# Patient Record
Sex: MALE | Race: WHITE | NOT HISPANIC OR LATINO | URBAN - METROPOLITAN AREA
[De-identification: names, ages, dates, MRNs, and addresses within clinical notes are randomized per-mention and may not be internally consistent; named-entity substitution may affect disease eponyms.]

---

## 2024-02-26 ENCOUNTER — INPATIENT (INPATIENT)
Facility: HOSPITAL | Age: 79
LOS: 6 days | Discharge: NURSING, REHAB OR LONG-TERM CARE | DRG: 322 | End: 2024-03-04
Attending: STUDENT IN AN ORGANIZED HEALTH CARE EDUCATION/TRAINING PROGRAM | Admitting: STUDENT IN AN ORGANIZED HEALTH CARE EDUCATION/TRAINING PROGRAM
Payer: COMMERCIAL

## 2024-02-26 VITALS
SYSTOLIC BLOOD PRESSURE: 167 MMHG | RESPIRATION RATE: 18 BRPM | HEART RATE: 67 BPM | HEIGHT: 64 IN | WEIGHT: 104.94 LBS | OXYGEN SATURATION: 99 % | TEMPERATURE: 98 F | DIASTOLIC BLOOD PRESSURE: 81 MMHG

## 2024-02-26 DIAGNOSIS — I25.10 ATHEROSCLEROTIC HEART DISEASE OF NATIVE CORONARY ARTERY WITHOUT ANGINA PECTORIS: ICD-10-CM

## 2024-02-26 DIAGNOSIS — R07.9 CHEST PAIN, UNSPECIFIED: ICD-10-CM

## 2024-02-26 DIAGNOSIS — R07.89 OTHER CHEST PAIN: ICD-10-CM

## 2024-02-26 DIAGNOSIS — Z29.9 ENCOUNTER FOR PROPHYLACTIC MEASURES, UNSPECIFIED: ICD-10-CM

## 2024-02-26 DIAGNOSIS — N40.0 BENIGN PROSTATIC HYPERPLASIA WITHOUT LOWER URINARY TRACT SYMPTOMS: ICD-10-CM

## 2024-02-26 DIAGNOSIS — E11.9 TYPE 2 DIABETES MELLITUS WITHOUT COMPLICATIONS: ICD-10-CM

## 2024-02-26 DIAGNOSIS — M25.561 PAIN IN RIGHT KNEE: ICD-10-CM

## 2024-02-26 LAB
ALBUMIN SERPL ELPH-MCNC: 3.9 G/DL — SIGNIFICANT CHANGE UP (ref 3.3–5)
ALP SERPL-CCNC: 89 U/L — SIGNIFICANT CHANGE UP (ref 40–120)
ALT FLD-CCNC: 15 U/L — SIGNIFICANT CHANGE UP (ref 10–45)
ANION GAP SERPL CALC-SCNC: 12 MMOL/L — SIGNIFICANT CHANGE UP (ref 5–17)
AST SERPL-CCNC: 12 U/L — SIGNIFICANT CHANGE UP (ref 10–40)
BASOPHILS # BLD AUTO: 0.04 K/UL — SIGNIFICANT CHANGE UP (ref 0–0.2)
BASOPHILS NFR BLD AUTO: 0.5 % — SIGNIFICANT CHANGE UP (ref 0–2)
BILIRUB SERPL-MCNC: 0.2 MG/DL — SIGNIFICANT CHANGE UP (ref 0.2–1.2)
BUN SERPL-MCNC: 48 MG/DL — HIGH (ref 7–23)
CALCIUM SERPL-MCNC: 9.9 MG/DL — SIGNIFICANT CHANGE UP (ref 8.4–10.5)
CHLORIDE SERPL-SCNC: 104 MMOL/L — SIGNIFICANT CHANGE UP (ref 96–108)
CHOLEST SERPL-MCNC: 147 MG/DL — SIGNIFICANT CHANGE UP
CK MB CFR SERPL CALC: 2.1 NG/ML — SIGNIFICANT CHANGE UP (ref 0–6.7)
CK SERPL-CCNC: 89 U/L — SIGNIFICANT CHANGE UP (ref 30–200)
CO2 SERPL-SCNC: 22 MMOL/L — SIGNIFICANT CHANGE UP (ref 22–31)
CREAT SERPL-MCNC: 1.2 MG/DL — SIGNIFICANT CHANGE UP (ref 0.5–1.3)
EGFR: 61 ML/MIN/1.73M2 — SIGNIFICANT CHANGE UP
EOSINOPHIL # BLD AUTO: 0.33 K/UL — SIGNIFICANT CHANGE UP (ref 0–0.5)
EOSINOPHIL NFR BLD AUTO: 4.3 % — SIGNIFICANT CHANGE UP (ref 0–6)
GLUCOSE BLDC GLUCOMTR-MCNC: 111 MG/DL — HIGH (ref 70–99)
GLUCOSE BLDC GLUCOMTR-MCNC: 133 MG/DL — HIGH (ref 70–99)
GLUCOSE BLDC GLUCOMTR-MCNC: 146 MG/DL — HIGH (ref 70–99)
GLUCOSE SERPL-MCNC: 87 MG/DL — SIGNIFICANT CHANGE UP (ref 70–99)
HCT VFR BLD CALC: 36 % — LOW (ref 39–50)
HDLC SERPL-MCNC: 33 MG/DL — LOW
HGB BLD-MCNC: 12.2 G/DL — LOW (ref 13–17)
IMM GRANULOCYTES NFR BLD AUTO: 0.3 % — SIGNIFICANT CHANGE UP (ref 0–0.9)
LIPID PNL WITH DIRECT LDL SERPL: 87 MG/DL — SIGNIFICANT CHANGE UP
LYMPHOCYTES # BLD AUTO: 2.61 K/UL — SIGNIFICANT CHANGE UP (ref 1–3.3)
LYMPHOCYTES # BLD AUTO: 34.3 % — SIGNIFICANT CHANGE UP (ref 13–44)
MCHC RBC-ENTMCNC: 31.2 PG — SIGNIFICANT CHANGE UP (ref 27–34)
MCHC RBC-ENTMCNC: 33.9 GM/DL — SIGNIFICANT CHANGE UP (ref 32–36)
MCV RBC AUTO: 92.1 FL — SIGNIFICANT CHANGE UP (ref 80–100)
MONOCYTES # BLD AUTO: 0.82 K/UL — SIGNIFICANT CHANGE UP (ref 0–0.9)
MONOCYTES NFR BLD AUTO: 10.8 % — SIGNIFICANT CHANGE UP (ref 2–14)
NEUTROPHILS # BLD AUTO: 3.78 K/UL — SIGNIFICANT CHANGE UP (ref 1.8–7.4)
NEUTROPHILS NFR BLD AUTO: 49.8 % — SIGNIFICANT CHANGE UP (ref 43–77)
NON HDL CHOLESTEROL: 114 MG/DL — SIGNIFICANT CHANGE UP
NRBC # BLD: 0 /100 WBCS — SIGNIFICANT CHANGE UP (ref 0–0)
NT-PROBNP SERPL-SCNC: 291 PG/ML — SIGNIFICANT CHANGE UP (ref 0–300)
PLATELET # BLD AUTO: 360 K/UL — SIGNIFICANT CHANGE UP (ref 150–400)
POTASSIUM SERPL-MCNC: 4.4 MMOL/L — SIGNIFICANT CHANGE UP (ref 3.5–5.3)
POTASSIUM SERPL-SCNC: 4.4 MMOL/L — SIGNIFICANT CHANGE UP (ref 3.5–5.3)
PROT SERPL-MCNC: 6.8 G/DL — SIGNIFICANT CHANGE UP (ref 6–8.3)
RBC # BLD: 3.91 M/UL — LOW (ref 4.2–5.8)
RBC # FLD: 11.7 % — SIGNIFICANT CHANGE UP (ref 10.3–14.5)
SODIUM SERPL-SCNC: 138 MMOL/L — SIGNIFICANT CHANGE UP (ref 135–145)
TRIGL SERPL-MCNC: 153 MG/DL — HIGH
TROPONIN T, HIGH SENSITIVITY RESULT: 26 NG/L — SIGNIFICANT CHANGE UP (ref 0–51)
TROPONIN T, HIGH SENSITIVITY RESULT: 28 NG/L — SIGNIFICANT CHANGE UP (ref 0–51)
TROPONIN T, HIGH SENSITIVITY RESULT: 31 NG/L — SIGNIFICANT CHANGE UP (ref 0–51)
TSH SERPL-MCNC: 1.21 UIU/ML — SIGNIFICANT CHANGE UP (ref 0.27–4.2)
WBC # BLD: 7.6 K/UL — SIGNIFICANT CHANGE UP (ref 3.8–10.5)
WBC # FLD AUTO: 7.6 K/UL — SIGNIFICANT CHANGE UP (ref 3.8–10.5)

## 2024-02-26 RX ORDER — DEXTROSE 50 % IN WATER 50 %
12.5 SYRINGE (ML) INTRAVENOUS ONCE
Refills: 0 | Status: DISCONTINUED | OUTPATIENT
Start: 2024-02-26 | End: 2024-03-04

## 2024-02-26 RX ORDER — FINASTERIDE 5 MG/1
1 TABLET, FILM COATED ORAL
Refills: 0 | DISCHARGE

## 2024-02-26 RX ORDER — CLOPIDOGREL BISULFATE 75 MG/1
1 TABLET, FILM COATED ORAL
Refills: 0 | DISCHARGE

## 2024-02-26 RX ORDER — DEXTROSE 50 % IN WATER 50 %
15 SYRINGE (ML) INTRAVENOUS ONCE
Refills: 0 | Status: DISCONTINUED | OUTPATIENT
Start: 2024-02-26 | End: 2024-03-04

## 2024-02-26 RX ORDER — ATORVASTATIN CALCIUM 80 MG/1
40 TABLET, FILM COATED ORAL AT BEDTIME
Refills: 0 | Status: DISCONTINUED | OUTPATIENT
Start: 2024-02-26 | End: 2024-03-04

## 2024-02-26 RX ORDER — SODIUM CHLORIDE 9 MG/ML
1000 INJECTION, SOLUTION INTRAVENOUS
Refills: 0 | Status: DISCONTINUED | OUTPATIENT
Start: 2024-02-26 | End: 2024-03-04

## 2024-02-26 RX ORDER — ACETAMINOPHEN 500 MG
725 TABLET ORAL ONCE
Refills: 0 | Status: COMPLETED | OUTPATIENT
Start: 2024-02-26 | End: 2024-02-26

## 2024-02-26 RX ORDER — LOSARTAN POTASSIUM 100 MG/1
1 TABLET, FILM COATED ORAL
Refills: 0 | DISCHARGE

## 2024-02-26 RX ORDER — ASPIRIN/CALCIUM CARB/MAGNESIUM 324 MG
81 TABLET ORAL DAILY
Refills: 0 | Status: DISCONTINUED | OUTPATIENT
Start: 2024-02-26 | End: 2024-03-04

## 2024-02-26 RX ORDER — METFORMIN HYDROCHLORIDE 850 MG/1
1 TABLET ORAL
Refills: 0 | DISCHARGE

## 2024-02-26 RX ORDER — LOSARTAN POTASSIUM 100 MG/1
100 TABLET, FILM COATED ORAL DAILY
Refills: 0 | Status: DISCONTINUED | OUTPATIENT
Start: 2024-02-26 | End: 2024-03-04

## 2024-02-26 RX ORDER — POLYETHYLENE GLYCOL 3350 17 G/17G
17 POWDER, FOR SOLUTION ORAL DAILY
Refills: 0 | Status: DISCONTINUED | OUTPATIENT
Start: 2024-02-26 | End: 2024-03-04

## 2024-02-26 RX ORDER — ATORVASTATIN CALCIUM 80 MG/1
1 TABLET, FILM COATED ORAL
Refills: 0 | DISCHARGE

## 2024-02-26 RX ORDER — LIDOCAINE 4 G/100G
1 CREAM TOPICAL
Refills: 0 | DISCHARGE

## 2024-02-26 RX ORDER — DEXTROSE 50 % IN WATER 50 %
25 SYRINGE (ML) INTRAVENOUS ONCE
Refills: 0 | Status: DISCONTINUED | OUTPATIENT
Start: 2024-02-26 | End: 2024-03-04

## 2024-02-26 RX ORDER — CARVEDILOL PHOSPHATE 80 MG/1
25 CAPSULE, EXTENDED RELEASE ORAL EVERY 12 HOURS
Refills: 0 | Status: DISCONTINUED | OUTPATIENT
Start: 2024-02-26 | End: 2024-03-04

## 2024-02-26 RX ORDER — ASPIRIN/CALCIUM CARB/MAGNESIUM 324 MG
1 TABLET ORAL
Refills: 0 | DISCHARGE

## 2024-02-26 RX ORDER — LIDOCAINE 4 G/100G
1 CREAM TOPICAL DAILY
Refills: 0 | Status: DISCONTINUED | OUTPATIENT
Start: 2024-02-26 | End: 2024-03-04

## 2024-02-26 RX ORDER — ISOSORBIDE MONONITRATE 60 MG/1
1 TABLET, EXTENDED RELEASE ORAL
Refills: 0 | DISCHARGE

## 2024-02-26 RX ORDER — POLYETHYLENE GLYCOL 3350 17 G/17G
17 POWDER, FOR SOLUTION ORAL
Refills: 0 | DISCHARGE

## 2024-02-26 RX ORDER — CLOPIDOGREL BISULFATE 75 MG/1
75 TABLET, FILM COATED ORAL DAILY
Refills: 0 | Status: DISCONTINUED | OUTPATIENT
Start: 2024-02-26 | End: 2024-03-04

## 2024-02-26 RX ORDER — ACETAMINOPHEN 500 MG
2 TABLET ORAL
Refills: 0 | DISCHARGE

## 2024-02-26 RX ORDER — CARVEDILOL PHOSPHATE 80 MG/1
1 CAPSULE, EXTENDED RELEASE ORAL
Refills: 0 | DISCHARGE

## 2024-02-26 RX ORDER — ISOSORBIDE MONONITRATE 60 MG/1
30 TABLET, EXTENDED RELEASE ORAL DAILY
Refills: 0 | Status: DISCONTINUED | OUTPATIENT
Start: 2024-02-26 | End: 2024-02-27

## 2024-02-26 RX ORDER — GLUCAGON INJECTION, SOLUTION 0.5 MG/.1ML
1 INJECTION, SOLUTION SUBCUTANEOUS ONCE
Refills: 0 | Status: DISCONTINUED | OUTPATIENT
Start: 2024-02-26 | End: 2024-03-04

## 2024-02-26 RX ORDER — FINASTERIDE 5 MG/1
5 TABLET, FILM COATED ORAL DAILY
Refills: 0 | Status: DISCONTINUED | OUTPATIENT
Start: 2024-02-26 | End: 2024-03-04

## 2024-02-26 RX ORDER — INSULIN LISPRO 100/ML
VIAL (ML) SUBCUTANEOUS
Refills: 0 | Status: DISCONTINUED | OUTPATIENT
Start: 2024-02-26 | End: 2024-03-04

## 2024-02-26 RX ORDER — INSULIN LISPRO 100/ML
VIAL (ML) SUBCUTANEOUS AT BEDTIME
Refills: 0 | Status: DISCONTINUED | OUTPATIENT
Start: 2024-02-26 | End: 2024-03-04

## 2024-02-26 RX ORDER — ACETAMINOPHEN 500 MG
650 TABLET ORAL EVERY 6 HOURS
Refills: 0 | Status: DISCONTINUED | OUTPATIENT
Start: 2024-02-26 | End: 2024-03-04

## 2024-02-26 RX ADMIN — LIDOCAINE 1 PATCH: 4 CREAM TOPICAL at 12:29

## 2024-02-26 RX ADMIN — Medication 81 MILLIGRAM(S): at 12:28

## 2024-02-26 RX ADMIN — LIDOCAINE 1 PATCH: 4 CREAM TOPICAL at 19:07

## 2024-02-26 RX ADMIN — Medication 650 MILLIGRAM(S): at 08:53

## 2024-02-26 RX ADMIN — CLOPIDOGREL BISULFATE 75 MILLIGRAM(S): 75 TABLET, FILM COATED ORAL at 12:29

## 2024-02-26 RX ADMIN — FINASTERIDE 5 MILLIGRAM(S): 5 TABLET, FILM COATED ORAL at 12:29

## 2024-02-26 RX ADMIN — ATORVASTATIN CALCIUM 40 MILLIGRAM(S): 80 TABLET, FILM COATED ORAL at 21:17

## 2024-02-26 RX ADMIN — Medication 650 MILLIGRAM(S): at 09:23

## 2024-02-26 RX ADMIN — CARVEDILOL PHOSPHATE 25 MILLIGRAM(S): 80 CAPSULE, EXTENDED RELEASE ORAL at 17:37

## 2024-02-26 RX ADMIN — Medication 725 MILLIGRAM(S): at 05:35

## 2024-02-26 RX ADMIN — ISOSORBIDE MONONITRATE 30 MILLIGRAM(S): 60 TABLET, EXTENDED RELEASE ORAL at 12:29

## 2024-02-26 RX ADMIN — Medication 290 MILLIGRAM(S): at 05:05

## 2024-02-26 NOTE — DISCHARGE NOTE PROVIDER - NSDCFUADDINST_GEN_ALL_CORE_FT
Wound Care:   the day AFTER your procedure remove bandage GENTLY, and clean using mild soap and gentle warm, water stream, pat dry. leave OPEN to air. YOU MAY SHOWER   DO NOT apply lotions, creams, ointments, powder, perfumes to your incision site  DO NOT SOAK your site for 1 week ( no baths, no pools, no tubs, etc)  Check  your groin and /or wirst daily.A small amount of bruising, and soarness are normal    ACTIVITY: for 24 hours   - DO NOT DRIVE  - DO NOT make any important decisions or sign legal documents   - DO NOT operate heavy machineries   - you may resume sexual activity in 48 hours, unless otherwise instructed by your cardiologist     If your procedure was done through the WRIST: for the NEXT 3DAYS:  - avoid pushing, pulling, with that affected wrist   - avoid repeated movement of that hand and wrist ( eg: typing, hammering)  - DO NOT LIFT anything more than 5 lbs     If your procedure was done through the GROIN: for the NEXT 5 DAYS  - Limit climbing stairs, DO NOT soak in bathtub or pool  - no strenuous activities, pushing, pulling, straining  - Do not lift anything 10lbs or heavier     MEDICATION:   take your medications as explained (see discharge paperwork)   If you received a STENT, you will be taking antiplatelet medications to KEEP YOUR STENT OPEN ( eg: Aspirin, Plavix, Brilinta, Effient, etc).  Take as prescribed DO NOT STOP taking them without consulting with your cardiologist first.     Follow heart healthy diet recommended by your doctor, if you smoke STOP SMOKING ( may call [phone] for center of tobacco control if you need assistance)     CALL your doctor to make appointment in 2 WEEKS     ***CALL YOUR DOCTOR***  if you experience: fever, chills, body aches, or severe pain, swelling, redness, heat or yellow discharge at incision site  If you experience Bleeding or excruciating pain at the procedural site, swelling ( golf ball size) at your procedural site  If you experience CHEST PAIN  If you experience extremity numbness, tingling, temperature change ( of your procedural site)   If you are unable to reach your doctor, you may contact:   -Cardiology Office at [facility] at [phone] or   - [facility] [phone]  - [facility] [phone]

## 2024-02-26 NOTE — ED PROVIDER NOTE - ATTENDING CONTRIBUTION TO CARE
MD Byrd:  patient seen and evaluated personally.   I agree with the History & Physical,  Impression & Plan other than what was detailed in my note.  MD Byrd  78-year-old male history of 7 stents on Xarelto chief complaint chest pain on left side, has not had pain like this before, started at rest, no associated sob, n/v, diaphoresis, no radiation, no leg swelling, non pleuritic, afebrile vitals stable  non toxic well appearing, NC/AT,  conjunctiva non conjected, sclera anicteric, moist mucous membranes, neck supple, heart sounds, normal, no mrg, lungs cta b/l no wrr, abd soft non distended w/ no tenderness, no visual deformities of extremities, axox3, , normal mood and affect, pt well appearing, feeling better, will need ekg, cbc, cmp, trop, pe considered, less likely given on xarelto, no rf for such, no sob/pleurisy,not hypoxic/tachcyardic, will get x ray chest.

## 2024-02-26 NOTE — ED PROVIDER NOTE - NS ED ATTENDING STATEMENT MOD
Caller: Benton Lizeth Moe    Relationship: Self    Best call back number: [phone]     Requested Prescriptions:   Requested Prescriptions     Pending Prescriptions Disp Refills    Insulin Pen Needle (Droplet Pen Needles) 31G X 5 MM misc 300 each 3     Sig: Use daily with insulin        Pharmacy where request should be sent: [facility] [address] [place] [address] [phone] [facility] [phone] FX     Last office visit with prescribing clinician: 12/4/2023   Last telemedicine visit with prescribing clinician: Visit date not found   Next office visit with prescribing clinician: Visit date not found     Additional details provided by patient: HAS A FEW PEN NEEDLES LEFT.  HAS 5 NEEDLES LEFT, USES 2 PER DAY    Does the patient have less than a 3 day supply:  [x] Yes  [] No    Would you like a call back once the refill request has been completed: [] Yes [x] No    If the office needs to give you a call back, can they leave a voicemail: [] Yes [x] No    Jena Yepez   12/18/23 11:56 EST     
Attending with

## 2024-02-26 NOTE — H&P ADULT - ASSESSMENT
78-year-old male history of  CAD s/p PCI (last 2021 to OM1), HTN, HLD, DM2, BPH presents with chest pain at rest from rehab admitted for ischemic evaluation

## 2024-02-26 NOTE — PHYSICAL THERAPY INITIAL EVALUATION ADULT - TRANSFER TRAINING, PT EVAL
GOAL: Pt will perform sit to/from stand transfers CGA with RW within 2-3 weeks. GOAL: Pt will perform sit to/from stand transfers independently with RW within 2-3 weeks.

## 2024-02-26 NOTE — ED ADULT NURSE REASSESSMENT NOTE - NS ED NURSE REASSESS COMMENT FT1
pt turned and repositioned; changed wet diaper; placed fresh absorbant pad; skin is intact. pt turned and repositioned; changed wet diaper; placed fresh absorbant pad; skin is intact, except for abrasions noted on bilateral knees.

## 2024-02-26 NOTE — ED PROVIDER NOTE - PHYSICAL EXAMINATION
GENERAL: Awake, alert, NAD  LUNGS: CTAB, no wheezes or crackles  Tenderness mild along the chest wall left side parasternal  CARDIAC: RRR, no m/r/g  ABDOMEN: Soft, non tender, non distended, no rebound, no guarding  EXT: No edema, no calf tenderness, 2+ DP pulses bilaterally, no deformities.  NEURO: Moving all extremities.  SKIN: Warm and dry. No rash.  PSYCH: Normal affect.

## 2024-02-26 NOTE — CONSULT NOTE ADULT - SUBJECTIVE AND OBJECTIVE BOX
DATE OF SERVICE: 02-26-24 @ 23:22    CHIEF COMPLAINT:Patient is a 78y old  Male who presents with a chief complaint of chest pain (26 Feb 2024 22:42)      HISTORY OF PRESENT ILLNESS:HPI:  Other MRN [id number]  78-year-old male history of  CAD s/p PCI (last 2021 to OM1), HTN, HLD, DM2, BPH presents from [facility] rehab with complaint chest pain about 11pm while lying in bed which awaken him.  Radhika is sharp, left chest, reproducible to touch, 7/10 in severity, lasted for 30min, intermittent, with no radiation or associated symptoms of SOB, diaphoresis, headache or LOC, similar to prior cardiac chest pain.  Currently, pain has improved    Last cardio visit (5/2023) atypical chest pain, recommended stress test and TTE but was not done. (26 Feb 2024 06:10)      PAST MEDICAL & SURGICAL HISTORY:  CAD S/P percutaneous coronary angioplasty      Diabetes mellitus      HTN (hypertension)      HLD (hyperlipidemia)      BPH (benign prostatic hyperplasia)      No significant past surgical history              MEDICATIONS:  aspirin  chewable 81 milliGRAM(s) Oral daily  carvedilol 25 milliGRAM(s) Oral every 12 hours  clopidogrel Tablet 75 milliGRAM(s) Oral daily  isosorbide   mononitrate ER Tablet (IMDUR) 30 milliGRAM(s) Oral daily  losartan 100 milliGRAM(s) Oral daily        acetaminophen     Tablet .. 650 milliGRAM(s) Oral every 6 hours PRN    polyethylene glycol 3350 17 Gram(s) Oral daily PRN    atorvastatin 40 milliGRAM(s) Oral at bedtime  dextrose 50% Injectable 25 Gram(s) IV Push once  dextrose 50% Injectable 25 Gram(s) IV Push once  dextrose 50% Injectable 12.5 Gram(s) IV Push once  dextrose Oral Gel 15 Gram(s) Oral once PRN  finasteride 5 milliGRAM(s) Oral daily  glucagon  Injectable 1 milliGRAM(s) IntraMuscular once  insulin lispro (ADMELOG) corrective regimen sliding scale   SubCutaneous at bedtime  insulin lispro (ADMELOG) corrective regimen sliding scale   SubCutaneous three times a day before meals    dextrose 5%. 1000 milliLiter(s) IV Continuous <Continuous>  dextrose 5%. 1000 milliLiter(s) IV Continuous <Continuous>  lidocaine   4% Patch 1 Patch Transdermal daily      FAMILY HISTORY:  FH: diabetes mellitus (Mother, Sibling)        Non-contributory    SOCIAL HISTORY:    [ ] not a current smoker    Allergies    No Known Drug Allergies  Fish Products (Hives)    Intolerances    	    REVIEW OF SYSTEMS:  CONSTITUTIONAL: No fever  EYES: No eye pain, visual disturbances, or discharge  ENMT:  No difficulty hearing, tinnitus  NECK: No pain or stiffness  RESPIRATORY: No cough, wheezing,  CARDIOVASCULAR: +chest pain, no palpitations, passing out, dizziness, or leg swelling  GASTROINTESTINAL:  No nausea, vomiting, diarrhea or constipation. No melena.  GENITOURINARY: No dysuria, hematuria  NEUROLOGICAL: No stroke like symptoms  SKIN: No burning or lesions   ENDOCRINE: No heat or cold intolerance  MUSCULOSKELETAL: No joint pain or swelling  PSYCHIATRIC: No  anxiety, mood swings  HEME/LYMPH: No bleeding gums  ALLERGY AND IMMUNOLOGIC: No hives or eczema	    All other ROS negative    PHYSICAL EXAM:  T(C): 36.7 (02-26-24 @ 19:39), Max: 37.1 (02-26-24 @ 16:00)  HR: 57 (02-26-24 @ 19:39) (57 - 69)  BP: 137/65 (02-26-24 @ 19:39) (131/73 - 167/81)  RR: 18 (02-26-24 @ 19:39) (15 - 18)  SpO2: 97% (02-26-24 @ 19:39) (97% - 100%)  Wt(kg): --  I&O's Summary    26 Feb 2024 07:01  -  26 Feb 2024 23:22  --------------------------------------------------------  IN: 0 mL / OUT: 400 mL / NET: -400 mL        Appearance: Normal	  HEENT:   Normal oral mucosa, EOMI	  Cardiovascular:  S1 S2, No JVD,    Respiratory: Lungs clear to auscultation	  Psychiatry: Alert  Gastrointestinal:  Soft, Non-tender, + BS	  Skin: No rashes   Neurologic: Non-focal  Extremities:  No edema  Vascular: Peripheral pulses palpable    	    	  	  CARDIAC MARKERS:  Labs personally reviewed by me                                  12.2   7.60  )-----------( 360      ( 26 Feb 2024 02:34 )             36.0     02-26    138  |  104  |  48<H>  ----------------------------<  87  4.4   |  22  |  1.20    Ca    9.9      26 Feb 2024 02:34    TPro  6.8  /  Alb  3.9  /  TBili  0.2  /  DBili  x   /  AST  12  /  ALT  15  /  AlkPhos  89  02-26          EKG: Personally reviewed by me - NSR ROLANDO LAFB  Radiology: Personally reviewed by me - CXR clear lungs    TTE CONCLUSIONS:   1. Images are suboptimal despite the use of Definity.   2. Left ventricular cavity is normal in size. Left ventricular wall thickness is normal.   3. Left ventricular endocardium is not well visualized; however, the left ventricular systolic function appears grossly normal.   4. Study quality precludes ability to rule-out regional wall motion abnormalities.        Assessment/Plan:  78-year-old male history of  CAD s/p PCI (last 2021 to OM1), HTN, HLD, DM2, BPH presents with chest pain at rest from rehab admitted for ischemic evaluation        Problem/Plan - 1:  ·  Problem: Atypical chest pain.   ·  Plan: sharp chest pain at rest and reproducible, no exertional chest pain with PT at rehab, EKG unchanged and Troponin 31 and 28  - TTE limited windows but unremarkable  - NM stress test pending  - tele monitor for arrhythmia  - will risk stratify with A1C and FLP    Problem/Plan - 2:  ·  Problem: CAD S/P percutaneous coronary angioplasty.   ·  Plan: - will continue ASA, Plavix, Coreg, Imdur, and statin.    Problem/Plan - 3:  ·  Problem: Diabetes mellitus.   ·  Plan: NISS  - will follow up A1C.    Problem/Plan - 4:  ·  Problem: HTN   ·  Plan: Will continue Coreg, Imdur and Losartan    Problem/Plan - 5:  ·  Problem: Prophylactic measure.   ·  Plan: - Heparin sc.            Differential diagnosis and plan of care discussed with patient after the evaluation. Counseling on diet, nutritional counseling, weight management, exercise and medication compliance was done.   Advanced care planning/advanced directives discussed with patient/family. DNR status including forceful chest compressions to attempt to restart the heart, ventilator support/artificial breathing, electric shock, artificial nutrition, health care proxy, Molst form all discussed with pt. Pt wishes to consider. Sixteen minutes spent on discussing advanced directives.            Sreedhar Cordon DO [facility]  Cardiovascular Medicine  [address], [address]  Office [phone]  Available via call/text on [employer] Teams

## 2024-02-26 NOTE — PHYSICAL THERAPY INITIAL EVALUATION ADULT - LEVEL OF INDEPENDENCE: SIT/STAND, REHAB EVAL
pt refused transfer this time .No reason stated. Not receptive to encouragement. Will f/u. minimum assist (75% patients effort)

## 2024-02-26 NOTE — H&P ADULT - HISTORY OF PRESENT ILLNESS
78-year-old male history of 7 stents on Xarelto chief complaint chest pain.  Patient was symptoms noted at approximately 11 PM patient was pain localized to the left sternal border on and off in nature denies any fever associated with it. Other MRN [id number]  78-year-old male history of 7 stents on Xarelto chief complaint chest pain.  Patient was symptoms noted at approximately 11 PM patient was pain localized to the left sternal border on and off in nature denies any fever associated with it. Other MRN [id number]  78-year-old male history of  CAD s/p PCI (last 2021 to OM1), HTN, HLD, DM2, BPH presents from [facility] rehab with complaint chest pain about 11pm while lying in bed which awaken him.  Radhika is sharp, left chest, reproducible to touch, 7/10 in severity, lasted for 30min, intermittent, with no radiation or associated symptoms of SOB, diaphoresis, headache or LOC, similar to prior cardiac chest pain.  Currently, pain has improved    Last cardio visit (5/2023) atypical chest pain, recommended stress test and TTE but was not done.

## 2024-02-26 NOTE — PHYSICAL THERAPY INITIAL EVALUATION ADULT - PERTINENT HX OF CURRENT PROBLEM, REHAB EVAL
78-year-old male history of  CAD s/p PCI (last 2021 to OM1), HTN, HLD, DM2, BPH presents from [facility] rehab with complaint chest pain about 11pm while lying in bed which awaken him.  Radhika is sharp, left chest, reproducible to touch, 7/10 in severity, lasted for 30min, intermittent, with no radiation or associated symptoms of SOB, diaphoresis, headache or LOC, similar to prior cardiac chest pain.  Currently, pain has improved. Atypical chest pain.   ·  Plan: sharp chest pain at rest and reproducible, no exertional chest pain with PT at rehab, EKG unchanged and Troponin 31 and 28. - TTE and stress test to further evaluate.

## 2024-02-26 NOTE — ED ADULT NURSE NOTE - OBJECTIVE STATEMENT
pt arrived via [facility] from [facility] rehab due to midsternal chest pain x 45 mins; pt is alert and oriented; skin warm and dry; c/o chest pain rated   "6"; ; no radiation, no nausea; EKG done upon arrival and placed on bedside cardiac monitor; given 324 chewable asa by EMS prior to arrival; pt is receiving rehab due to frequent falls and bilateral osteoarthritis of knees; pt states he has been at [facility] for about a month; plan of care reviewed with pt.

## 2024-02-26 NOTE — H&P ADULT - NSICDXPASTMEDICALHX_GEN_ALL_CORE_FT
PAST MEDICAL HISTORY:  CAD S/P percutaneous coronary angioplasty      PAST MEDICAL HISTORY:  BPH (benign prostatic hyperplasia)     CAD S/P percutaneous coronary angioplasty     Diabetes mellitus     HLD (hyperlipidemia)     HTN (hypertension)

## 2024-02-26 NOTE — PATIENT PROFILE ADULT - FUNCTIONAL ASSESSMENT - BASIC MOBILITY 6.
4-calculated by average /Not able to assess (calculate score using [facility] averaging method)

## 2024-02-26 NOTE — PHYSICAL THERAPY INITIAL EVALUATION ADULT - ADDITIONAL COMMENTS
Pt. lives in apt. with son, 4 steps to get in. Patient ambulated with rolling walker independent. Pt owns rw at home.  Currently admitted from rehab.

## 2024-02-26 NOTE — H&P ADULT - PROBLEM SELECTOR PLAN 3
2 = difficulty understanding (not related to language barrier) - will hold Metformin  - will monitor fingersticks and cover with insulin sliding scale  - will follow up A1C

## 2024-02-26 NOTE — H&P ADULT - NSHPLABSRESULTS_GEN_ALL_CORE
Labs, CXR film and EKG tracing personally reviewed and interpreted Labs, CXR film and EKG tracing personally reviewed and interpreted    TTE (May 2022)  1.Endocardial visualization was enhanced with intravenous echo contrast.  2.Normal left ventricular systolic function.  3.Left ventricular ejection fraction, by visual estimation, is 55 to 60%.  4.Grade II diastolic dysfunction, elevated left atrial pressures.   5.Moderate concentric left ventricular hypertrophy 1.3-1.4cm.   6.Normal right ventricular size and function.  7.Sclerotic aortic valve with normal opening. Trace aortic regurgitation.   8.Mild mitral regurgitation.  9.Mild tricuspid regurgitation.   10.Mildly dilated left atrium. LA volume Index is 40 ml/m Labs, CXR film and EKG tracing personally reviewed and interpreted    TTE (6/7/2022)  1.Endocardial visualization was enhanced with intravenous echo contrast.  2.Normal left ventricular systolic function.  3.Left ventricular ejection fraction, by visual estimation, is 55 to 60%.  4.Grade II diastolic dysfunction, elevated left atrial pressures.   5.Moderate concentric left ventricular hypertrophy 1.3-1.4cm.   6.Normal right ventricular size and function.  7.Sclerotic aortic valve with normal opening. Trace aortic regurgitation.   8.Mild mitral regurgitation.  9.Mild tricuspid regurgitation.   10.Mildly dilated left atrium. LA volume Index is 40 ml/m    Cardiac cath (2/12/2021)  LCX: Large, dominant vessel. Mid 30% diffuse  OM1: Ostial 90% lesion. Distal 90% lesion  LPDA: 60% focal lesion distally  Intervention  - Successful PCI of proximal OM1 90% stenosis with a 2.25 x 16 mm Promus Elite CHARITY and  post-dilated with a 2.75 NC balloon with 0% residual stenosis, CRISTIAN 3 flow and excellent  angiographic result  - Successful PTCA of distal OM1 90% stenosis with a 1.5 x 15 mm Emerge Push balloon with 20%  residual stenosis CRISTIAN 3 flow and excellent angiographic result.  Right CFA access site closed using Perclose device with adequate hemostasis prior to leaving the  cath lab.  Recommendations  Continue DAPT with aspirin 81 mg daily indefinitely and brilinta 90 mg bid for at least 1 year  Optimal medical therapy - including high intensity statin, beta blocker, ACE-I/ARB if indicated  Risk factor modification  Follow up with outpatient cardiologist after discharge

## 2024-02-26 NOTE — ED PROVIDER NOTE - CLINICAL SUMMARY MEDICAL DECISION MAKING FREE TEXT BOX
Given history and physical clinical concern for possible ACS versus pneumonia.  Will assess with labs imaging likely to be admitted given high risk factors     Patient does not know the name of his cardiologist

## 2024-02-26 NOTE — DISCHARGE NOTE PROVIDER - NSDCMRMEDTOKEN_GEN_ALL_CORE_FT
acetaminophen 325 mg oral tablet: 2 tab(s) orally every 6 hours as needed for pain  Aspir 81 oral delayed release tablet: 1 tab(s) orally once a day  atorvastatin 40 mg oral tablet: 1 tab(s) orally once a day (at bedtime)  carvedilol 25 mg oral tablet: 1 tab(s) orally 2 times a day  clopidogrel 75 mg oral tablet: 1 tab(s) orally once a day  finasteride 5 mg oral tablet: 1 tab(s) orally once a day  isosorbide mononitrate 30 mg oral tablet, extended release: 1 tab(s) orally once a day (in the morning) - hold for SBP &lt;100 or HR &lt; 60  lidocaine 4% topical film: Apply topically to affected area once a day on left &amp; right knee, place at 9am and remove at 9pm  losartan 100 mg oral tablet: 1 tab(s) orally once a day - hold for SBP &lt;110  metFORMIN 500 mg oral tablet: 1 tab(s) orally 2 times a day  polyethylene glycol 3350 oral powder for reconstitution: 17 gram(s) orally as needed for  constipation   acetaminophen 325 mg oral tablet: 2 tab(s) orally every 6 hours as needed for pain  Aspir 81 oral delayed release tablet: 1 tab(s) orally once a day  atorvastatin 40 mg oral tablet: 1 tab(s) orally once a day (at bedtime)  carvedilol 25 mg oral tablet: 1 tab(s) orally 2 times a day  clopidogrel 75 mg oral tablet: 1 tab(s) orally once a day  finasteride 5 mg oral tablet: 1 tab(s) orally once a day  isosorbide mononitrate 60 mg oral tablet, extended release: 1 tab(s) orally once a day  lidocaine 4% topical film: Apply topically to affected area once a day on left &amp; right knee, place at 9am and remove at 9pm  losartan 100 mg oral tablet: 1 tab(s) orally once a day - hold for SBP &lt;110  melatonin 1 mg oral tablet: 1 tab(s) orally once a day (at bedtime)  metFORMIN 500 mg oral tablet: 1 tab(s) orally 2 times a day  polyethylene glycol 3350 oral powder for reconstitution: 17 gram(s) orally as needed for  constipation

## 2024-02-26 NOTE — DISCHARGE NOTE PROVIDER - HOSPITAL COURSE
78-year-old male history of  CAD s/p PCI (last 2021 to OM1), HTN, HLD, DM2, BPH presents from [facility] with complaint chest pain about 11pm while lying in bed which awaken him.  Radhika is sharp, left chest, reproducible to touch, 7/10 in severity, lasted for 30min, intermittent, with no radiation or associated symptoms of SOB, diaphoresis, headache or LOC, similar to prior cardiac chest pain.  TTE showed normal LV function however unable to rule out regional wall motion abnormalities.    HPI:  Other MRN [id number]  78-year-old male history of  CAD s/p PCI (last 2021 to OM1), HTN, HLD, DM2, BPH presents from [facility] with complaint chest pain about 11pm while lying in bed which awaken him.  Radhika is sharp, left chest, reproducible to touch, 7/10 in severity, lasted for 30min, intermittent, with no radiation or associated symptoms of SOB, diaphoresis, headache or LOC, similar to prior cardiac chest pain.  Currently, pain has improved  Last cardio visit (5/2023) atypical chest pain, recommended stress test and TTE but was not done. (26 Feb 2024 06:10)  78-year-old male history of  CAD s/p PCI (last 2021 to OM1), HTN, HLD, DM2, BPH presents from [facility] with complaint chest pain about 11pm while lying in bed which awaken him.  Radhika is sharp, left chest, reproducible to touch, 7/10 in severity, lasted for 30min, intermittent, with no radiation or associated symptoms of SOB, diaphoresis, headache or LOC, similar to prior cardiac chest pain.  TTE showed normal LV function however unable to rule out regional wall motion abnormalities.     Hospital Course:      Important Medication Changes and Reason:    Active or Pending Issues Requiring Follow-up:    Advanced Directives:   [ ] Full code  [ ] DNR  [ ] Hospice    Discharge Diagnoses:           HPI:  Other MRN [id number]  78-year-old male history of  CAD s/p PCI (last 2021 to OM1), HTN, HLD, DM2, BPH presents from [facility] with complaint chest pain about 11pm while lying in bed which awaken him.  Radhika is sharp, left chest, reproducible to touch, 7/10 in severity, lasted for 30min, intermittent, with no radiation or associated symptoms of SOB, diaphoresis, headache or LOC, similar to prior cardiac chest pain.  Currently, pain has improved  Last cardio visit (5/2023) atypical chest pain, recommended stress test and TTE but was not done. (26 Feb 2024 06:10)  78-year-old male history of  CAD s/p PCI (last 2021 to OM1), HTN, HLD, DM2, BPH presents from [facility] with complaint chest pain about 11pm while lying in bed which awaken him.  Radhika is sharp, left chest, reproducible to touch, 7/10 in severity, lasted for 30min, intermittent, with no radiation or associated symptoms of SOB, diaphoresis, headache or LOC, similar to prior cardiac chest pain.  TTE showed normal LV function however unable to rule out regional wall motion abnormalities.     Hospital Course: *****      Important Medication Changes and Reason: *****    Active or Pending Issues Requiring Follow-up:  PCP, Cardiology    Advanced Directives:   [ x] Full code  [ ] DNR  [ ] Hospice    Discharge Diagnoses:  CAD  CP         HPI:  Other MRN [id number]  78-year-old male history of  CAD s/p PCI (last 2021 to OM1), HTN, HLD, DM2, BPH presents from [facility] with complaint chest pain about 11pm while lying in bed which awaken him.  Radhika is sharp, left chest, reproducible to touch, 7/10 in severity, lasted for 30min, intermittent, with no radiation or associated symptoms of SOB, diaphoresis, headache or LOC, similar to prior cardiac chest pain.  Currently, pain has improved  Last cardio visit (5/2023) atypical chest pain, recommended stress test and TTE but was not done. (26 Feb 2024 06:10)  78-year-old male history of  CAD s/p PCI (last 2021 to OM1), HTN, HLD, DM2, BPH presents from [facility] with complaint chest pain about 11pm while lying in bed which awaken him.  Radhika is sharp, left chest, reproducible to touch, 7/10 in severity, lasted for 30min, intermittent, with no radiation or associated symptoms of SOB, diaphoresis, headache or LOC, similar to prior cardiac chest pain.  TTE showed normal LV function however unable to rule out regional wall motion abnormalities.     Hospital Course: 78-year-old male history of  CAD s/p PCI (last 2021 to OM1), HTN, HLD, DM2, BPH presents with chest pain at rest from rehab admitted for ischemic evaluation.    Atypical chest pain. Sharp chest pain at rest and reproducible, no exertional chest pain with PT at rehab, EKG unchanged and Troponin 31 and 28  - TTE limited windows but unremarkable  - NM stress test with large area of ischemia  - s/p cardiac cath 2/28 with PCI to mLCx  - c/w ASA 81mg PO, Plavix 75mg PO and Atorvastatin 40mg PO daily    Important Medication Changes and Reason:    Active or Pending Issues Requiring Follow-up:  PCP, Cardiology    Advanced Directives:   [ x] Full code  [ ] DNR  [ ] Hospice    Discharge Diagnoses:  CAD  CP

## 2024-02-26 NOTE — H&P ADULT - TIME BILLING
reviewing prior documentation, reviewing available recent outpatient records, independently obtaining a history and interpreting results of tests, performing a physical examination, reviewing tests/imaging, discussing the plan with the patient,, ordering medications/tests, documenting clinical information in the electronic health record, and coordinating care. reviewing prior documentation, reviewing available recent outpatient records, independently obtaining a history and interpreting results of tests, performing a physical examination, reviewing tests/imaging, discussing the plan with the patient and son, ordering medications/tests, documenting clinical information in the electronic health record, and coordinating care.

## 2024-02-26 NOTE — DISCHARGE NOTE PROVIDER - NSDCFUADDAPPT_GEN_ALL_CORE_FT
APPTS ARE READY TO BE MADE: [ ] YES    Best Family or Patient Contact (if needed):    Additional Information about above appointments (if needed):    1:   2:   3:     Other comments or requests:    APPTS ARE READY TO BE MADE: [x ] YES    Best Family or Patient Contact (if needed):    Additional Information about above appointments (if needed):    1:   2:   3:     Other comments or requests:    APPTS ARE READY TO BE MADE: [x ] YES    Best Family or Patient Contact (if needed):    Additional Information about above appointments (if needed):    1: Dr. Lagos  2:   3:     Other comments or requests:    APPTS ARE READY TO BE MADE: [X] YES    Best Family or Patient Contact (if needed):    Additional Information about above appointments (if needed):    1: Dr. Lagos 1 week  2: PCP 1 week   3:     Other comments or requests:    APPTS ARE READY TO BE MADE: [X] YES    Best Family or Patient Contact (if needed):    Additional Information about above appointments (if needed):    1: Dr. Lagos 1 week  2: PCP 1 week   3:     Other comments or requests:     Patient is being discharged to [facility]. Caregiver will arrange follow up.

## 2024-02-26 NOTE — ED PROVIDER NOTE - WR ORDER NAME 1
Pt given boxed lunch per pt request     Gomez Brenner RN  06/30/18 3598 Xray Chest 1 View- PORTABLE-Urgent

## 2024-02-26 NOTE — DISCHARGE NOTE PROVIDER - PROVIDER TOKENS
PROVIDER:[TOKEN:[[id number]],FOLLOWUP:[1-3 days],ESTABLISHEDPATIENT:[T]] PROVIDER:[TOKEN:[[id number]],FOLLOWUP:[2 weeks]],PROVIDER:[TOKEN:[[id number]],FOLLOWUP:[2 weeks]]

## 2024-02-26 NOTE — PHYSICAL THERAPY INITIAL EVALUATION ADULT - GAIT TRAINING, PT EVAL
GOAL: Pt will ambulate 75' CGA with  RW  in 2-3 wks. GOAL: Pt will ambulate 300ft independently with  RW  in 2-3 wks.

## 2024-02-26 NOTE — DISCHARGE NOTE PROVIDER - NSFOLLOWUPCLINICS_GEN_ALL_ED_FT
[facility] - Primary Care  Primary Care  [address] [address]  Phone: [phone]  Fax:   Follow Up Time: 1-3 days

## 2024-02-26 NOTE — DISCHARGE NOTE PROVIDER - CARE PROVIDERS DIRECT ADDRESSES
,DirectAddress_Unknown ,[email or web address]

## 2024-02-26 NOTE — DISCHARGE NOTE PROVIDER - ATTENDING DISCHARGE PHYSICAL EXAMINATION:
Seen and evaluated at bedside. Denies any CP, SOB, abd pain. Plan for d/c ANA.   General: NAD   HEENT: NC/AT; EOMI; MMM  Cards: RRR; +S1/S2  Resp: CTA b/l; no wheezes  Abd: soft, NT/ND; normoactive bowel sounds throughout  Extremities: moves all extremities   Neuro: grossly nonfocal

## 2024-02-26 NOTE — H&P ADULT - PROBLEM SELECTOR PLAN 1
sharp chest pain at rest and reproducible, no exertional chest pain with PT at rehab, EKG unchanged and Troponin 31 and 28  - TTE and stress test to further evaluate  - tele monitor for arrhythmia  - will continue ASA, Plavix, Coreg, Imdur and statin  - will risk stratify with A1C, FLP and TSH

## 2024-02-26 NOTE — H&P ADULT - ADDITIONAL PE
T(F): 98.1 (26 Feb 2024 04:00), Max: 98.2 (26 Feb 2024 01:50)  HR: 65 (26 Feb 2024 05:00) (59 - 67)  BP: 155/63 (26 Feb 2024 05:00) (140/60 - 167/81)  RR: 15 (26 Feb 2024 05:00) (15 - 18)  SpO2: 98% (26 Feb 2024 05:00) (98% - 100%): room air

## 2024-02-26 NOTE — DISCHARGE NOTE PROVIDER - NSDCCPCAREPLAN_GEN_ALL_CORE_FT
PRINCIPAL DISCHARGE DIAGNOSIS  Diagnosis: Chest pain  Assessment and Plan of Treatment:      PRINCIPAL DISCHARGE DIAGNOSIS  Diagnosis: Chest pain  Assessment and Plan of Treatment: - Stress test abnormal  - Left Heart Catheterization 2/28/24 with one stent placed to LCx  - Right radial site stable  - Continue DAPT (aspirin 81mg and clopidogrel 75mg)  - Continue atorvastatin  - EF Normal  - Recommend a heart healthy diet which includes a variety of fruits and vegetables, whole grains, low fat dairy products, legumes and skinless poulty and fish; food prepared with little or no salt and minimize processed foods  - Avoid using NSAIDs  (Aleve, Motrin, ibuprofen, naproxen) while on DAPT, please utilize Tylenol for pain control (not to exceed 4gm in 24 hours)  - Please follow-up with your primary care physician within one week of discharge.  - Please follow-up with cardiology within 2 weeks

## 2024-02-26 NOTE — ED PROVIDER NOTE - OBJECTIVE STATEMENT
78-year-old male history of 7 stents on Xarelto chief complaint chest pain.  Patient was symptoms noted at approximately 11 PM patient was pain localized to the left sternal border on and off in nature denies any fever associated with it.      No shortness of breath associated with it was not associated with anything specific.  No history/past medical records on patient

## 2024-02-26 NOTE — ED ADULT NURSE NOTE - NSFALLHARMRISKINTERV_ED_ALL_ED

## 2024-02-26 NOTE — DISCHARGE NOTE PROVIDER - CARE PROVIDER_API CALL
Rob Collier  Internal Medicine  [address] [place] [id number]  Phone: [phone]  Fax: [phone]  Established Patient  Follow Up Time: 1-3 days   Sreedhar Cordon  Cardiovascular Disease  [address] [facility] [address] [facility] [address] [facility] [address], [address]  [place] [address]  Phone: [phone]  Fax: [phone]  Follow Up Time: 2 weeks    Pramod Lagos  Cardiology  [address] [facility], [address]  [place] [address]  Phone: [phone]  Fax: [phone]  Follow Up Time: 2 weeks

## 2024-02-27 LAB
ANION GAP SERPL CALC-SCNC: 11 MMOL/L — SIGNIFICANT CHANGE UP (ref 5–17)
BUN SERPL-MCNC: 33 MG/DL — HIGH (ref 7–23)
CALCIUM SERPL-MCNC: 9.5 MG/DL — SIGNIFICANT CHANGE UP (ref 8.4–10.5)
CHLORIDE SERPL-SCNC: 106 MMOL/L — SIGNIFICANT CHANGE UP (ref 96–108)
CO2 SERPL-SCNC: 22 MMOL/L — SIGNIFICANT CHANGE UP (ref 22–31)
CREAT SERPL-MCNC: 0.94 MG/DL — SIGNIFICANT CHANGE UP (ref 0.5–1.3)
EGFR: 82 ML/MIN/1.73M2 — SIGNIFICANT CHANGE UP
GLUCOSE BLDC GLUCOMTR-MCNC: 138 MG/DL — HIGH (ref 70–99)
GLUCOSE BLDC GLUCOMTR-MCNC: 147 MG/DL — HIGH (ref 70–99)
GLUCOSE BLDC GLUCOMTR-MCNC: 179 MG/DL — HIGH (ref 70–99)
GLUCOSE BLDC GLUCOMTR-MCNC: 97 MG/DL — SIGNIFICANT CHANGE UP (ref 70–99)
GLUCOSE SERPL-MCNC: 99 MG/DL — SIGNIFICANT CHANGE UP (ref 70–99)
HCT VFR BLD CALC: 36.3 % — LOW (ref 39–50)
HGB BLD-MCNC: 12.6 G/DL — LOW (ref 13–17)
MAGNESIUM SERPL-MCNC: 1.5 MG/DL — LOW (ref 1.6–2.6)
MCHC RBC-ENTMCNC: 31.6 PG — SIGNIFICANT CHANGE UP (ref 27–34)
MCHC RBC-ENTMCNC: 34.7 GM/DL — SIGNIFICANT CHANGE UP (ref 32–36)
MCV RBC AUTO: 91 FL — SIGNIFICANT CHANGE UP (ref 80–100)
NRBC # BLD: 0 /100 WBCS — SIGNIFICANT CHANGE UP (ref 0–0)
PLATELET # BLD AUTO: 373 K/UL — SIGNIFICANT CHANGE UP (ref 150–400)
POTASSIUM SERPL-MCNC: 4.3 MMOL/L — SIGNIFICANT CHANGE UP (ref 3.5–5.3)
POTASSIUM SERPL-SCNC: 4.3 MMOL/L — SIGNIFICANT CHANGE UP (ref 3.5–5.3)
RBC # BLD: 3.99 M/UL — LOW (ref 4.2–5.8)
RBC # FLD: 11.6 % — SIGNIFICANT CHANGE UP (ref 10.3–14.5)
SODIUM SERPL-SCNC: 139 MMOL/L — SIGNIFICANT CHANGE UP (ref 135–145)
WBC # BLD: 8.39 K/UL — SIGNIFICANT CHANGE UP (ref 3.8–10.5)
WBC # FLD AUTO: 8.39 K/UL — SIGNIFICANT CHANGE UP (ref 3.8–10.5)

## 2024-02-27 RX ORDER — ISOSORBIDE MONONITRATE 60 MG/1
60 TABLET, EXTENDED RELEASE ORAL DAILY
Refills: 0 | Status: DISCONTINUED | OUTPATIENT
Start: 2024-02-27 | End: 2024-03-04

## 2024-02-27 RX ORDER — MAGNESIUM SULFATE 500 MG/ML
2 VIAL (ML) INJECTION
Refills: 0 | Status: COMPLETED | OUTPATIENT
Start: 2024-02-27 | End: 2024-02-27

## 2024-02-27 RX ADMIN — POLYETHYLENE GLYCOL 3350 17 GRAM(S): 17 POWDER, FOR SOLUTION ORAL at 21:22

## 2024-02-27 RX ADMIN — Medication 25 GRAM(S): at 04:11

## 2024-02-27 RX ADMIN — CLOPIDOGREL BISULFATE 75 MILLIGRAM(S): 75 TABLET, FILM COATED ORAL at 05:30

## 2024-02-27 RX ADMIN — CARVEDILOL PHOSPHATE 25 MILLIGRAM(S): 80 CAPSULE, EXTENDED RELEASE ORAL at 18:19

## 2024-02-27 RX ADMIN — Medication 81 MILLIGRAM(S): at 05:30

## 2024-02-27 RX ADMIN — Medication 1: at 12:54

## 2024-02-27 RX ADMIN — ATORVASTATIN CALCIUM 40 MILLIGRAM(S): 80 TABLET, FILM COATED ORAL at 21:22

## 2024-02-27 RX ADMIN — FINASTERIDE 5 MILLIGRAM(S): 5 TABLET, FILM COATED ORAL at 12:45

## 2024-02-27 RX ADMIN — LIDOCAINE 1 PATCH: 4 CREAM TOPICAL at 23:02

## 2024-02-27 RX ADMIN — Medication 650 MILLIGRAM(S): at 19:20

## 2024-02-27 RX ADMIN — Medication 25 GRAM(S): at 05:31

## 2024-02-27 RX ADMIN — Medication 650 MILLIGRAM(S): at 18:19

## 2024-02-27 RX ADMIN — LOSARTAN POTASSIUM 100 MILLIGRAM(S): 100 TABLET, FILM COATED ORAL at 05:29

## 2024-02-27 RX ADMIN — ISOSORBIDE MONONITRATE 60 MILLIGRAM(S): 60 TABLET, EXTENDED RELEASE ORAL at 12:45

## 2024-02-27 RX ADMIN — LIDOCAINE 1 PATCH: 4 CREAM TOPICAL at 12:53

## 2024-02-27 RX ADMIN — LIDOCAINE 1 PATCH: 4 CREAM TOPICAL at 00:00

## 2024-02-27 NOTE — PROGRESS NOTE ADULT - SUBJECTIVE AND OBJECTIVE BOX
DATE OF SERVICE: 02-27-24 @ 10:52    Patient is a 78y old  Male who presents with a chief complaint of chest pain (26 Feb 2024 22:42)      INTERVAL HISTORY: Feels well, denies chest pain and SOB    REVIEW OF SYSTEMS:  CONSTITUTIONAL: No weakness  EYES/ENT: No visual changes;  No throat pain   NECK: No pain or stiffness  RESPIRATORY: No cough, wheezing; No shortness of breath  CARDIOVASCULAR: No chest pain or palpitations  GASTROINTESTINAL: No abdominal  pain. No nausea, vomiting, or hematemesis  GENITOURINARY: No dysuria, frequency or hematuria  NEUROLOGICAL: No stroke like symptoms  SKIN: No rashes    TELEMETRY Personally reviewed: SB/SR 50-60  	  MEDICATIONS:  carvedilol 25 milliGRAM(s) Oral every 12 hours  isosorbide   mononitrate ER Tablet (IMDUR) 60 milliGRAM(s) Oral daily  losartan 100 milliGRAM(s) Oral daily        PHYSICAL EXAM:  T(C): 36.6 (02-27-24 @ 07:17), Max: 37.1 (02-26-24 @ 16:00)  HR: 56 (02-27-24 @ 07:17) (51 - 69)  BP: 175/79 (02-27-24 @ 07:17) (131/73 - 175/79)  RR: 18 (02-27-24 @ 07:17) (16 - 18)  SpO2: 98% (02-27-24 @ 07:17) (97% - 98%)  Wt(kg): --  I&O's Summary    26 Feb 2024 07:01  -  27 Feb 2024 07:00  --------------------------------------------------------  IN: 0 mL / OUT: 650 mL / NET: -650 mL          Appearance: In no distress	  HEENT:    PERRL, EOMI	  Cardiovascular:  S1 S2, No JVD  Respiratory: Lungs clear to auscultation	  Gastrointestinal:  Soft, Non-tender, + BS	  Vascularature:  No edema of LE  Psychiatric: Appropriate affect   Neuro: no acute focal deficits                               12.6   8.39  )-----------( 373      ( 27 Feb 2024 00:46 )             36.3     02-27    139  |  106  |  33<H>  ----------------------------<  99  4.3   |  22  |  0.94    Ca    9.5      27 Feb 2024 00:46  Mg     1.5     02-27    TPro  6.8  /  Alb  3.9  /  TBili  0.2  /  DBili  x   /  AST  12  /  ALT  15  /  AlkPhos  89  02-26        Labs personally reviewed      ASSESSMENT/PLAN: 	  78-year-old male history of  CAD s/p PCI (last 2021 to OM1), HTN, HLD, DM2, BPH presents with chest pain at rest from rehab admitted for ischemic evaluation        Problem/Plan - 1:  ·  Problem: Atypical chest pain.   ·  Plan: sharp chest pain at rest and reproducible, no exertional chest pain with PT at rehab, EKG unchanged and Troponin 31 and 28  - TTE limited windows but unremarkable  - NM stress test pending  - tele monitor for arrhythmia  - will risk stratify with A1C and FLP    Problem/Plan - 2:  ·  Problem: CAD S/P percutaneous coronary angioplasty.   ·  Plan: - will continue ASA, Plavix, Coreg, Imdur, and statin.    Problem/Plan - 3:  ·  Problem: Diabetes mellitus.   ·  Plan: NISS  - will follow up A1C.    Problem/Plan - 4:  ·  Problem: HTN   ·  Plan: Will continue Coreg, Imdur and Losartan    Problem/Plan - 5:  ·  Problem: Prophylactic measure.   ·  Plan: - Heparin sc.          CINDY Carmona DO [facility]  Cardiovascular Medicine  [address] [place] [address], [address]  Available via call or text on [employer] TEAM[employer]  Office: [phone]   DATE OF SERVICE: 02-27-24 @ 10:52    Patient is a 78y old  Male who presents with a chief complaint of chest pain (26 Feb 2024 22:42)      INTERVAL HISTORY: Feels well, denies chest pain and SOB    REVIEW OF SYSTEMS:  CONSTITUTIONAL: No weakness  EYES/ENT: No visual changes;  No throat pain   NECK: No pain or stiffness  RESPIRATORY: No cough, wheezing; No shortness of breath  CARDIOVASCULAR: No chest pain or palpitations  GASTROINTESTINAL: No abdominal  pain. No nausea, vomiting, or hematemesis  GENITOURINARY: No dysuria, frequency or hematuria  NEUROLOGICAL: No stroke like symptoms  SKIN: No rashes    TELEMETRY Personally reviewed: SB/SR 50-60  	  MEDICATIONS:  carvedilol 25 milliGRAM(s) Oral every 12 hours  isosorbide   mononitrate ER Tablet (IMDUR) 60 milliGRAM(s) Oral daily  losartan 100 milliGRAM(s) Oral daily        PHYSICAL EXAM:  T(C): 36.6 (02-27-24 @ 07:17), Max: 37.1 (02-26-24 @ 16:00)  HR: 56 (02-27-24 @ 07:17) (51 - 69)  BP: 175/79 (02-27-24 @ 07:17) (131/73 - 175/79)  RR: 18 (02-27-24 @ 07:17) (16 - 18)  SpO2: 98% (02-27-24 @ 07:17) (97% - 98%)  Wt(kg): --  I&O's Summary    26 Feb 2024 07:01  -  27 Feb 2024 07:00  --------------------------------------------------------  IN: 0 mL / OUT: 650 mL / NET: -650 mL          Appearance: In no distress	  HEENT:    PERRL, EOMI	  Cardiovascular:  S1 S2, No JVD  Respiratory: Lungs clear to auscultation	  Gastrointestinal:  Soft, Non-tender, + BS	  Vascularature:  No edema of LE  Psychiatric: Appropriate affect   Neuro: no acute focal deficits                               12.6   8.39  )-----------( 373      ( 27 Feb 2024 00:46 )             36.3     02-27    139  |  106  |  33<H>  ----------------------------<  99  4.3   |  22  |  0.94    Ca    9.5      27 Feb 2024 00:46  Mg     1.5     02-27    TPro  6.8  /  Alb  3.9  /  TBili  0.2  /  DBili  x   /  AST  12  /  ALT  15  /  AlkPhos  89  02-26        Labs personally reviewed      ASSESSMENT/PLAN: 	  78-year-old male history of  CAD s/p PCI (last 2021 to OM1), HTN, HLD, DM2, BPH presents with chest pain at rest from rehab admitted for ischemic evaluation        Problem/Plan - 1:  ·  Problem: Atypical chest pain.   ·  Plan: sharp chest pain at rest and reproducible, no exertional chest pain with PT at rehab, EKG unchanged and Troponin 31 and 28  - TTE limited windows but unremarkable  - NM stress test with large area of ischemia, plan for cardiac cath Wednesaday  - tele monitor for arrhythmia       Problem/Plan - 2:  ·  Problem: CAD S/P percutaneous coronary angioplasty.   ·  Plan: - will continue ASA, Plavix, Coreg, Imdur, and statin.    Problem/Plan - 3:  ·  Problem: Diabetes mellitus.   ·  Plan: NISS  - will follow up A1C.    Problem/Plan - 4:  ·  Problem: HTN   ·  Plan: Will continue Coreg, Imdur and Losartan    Problem/Plan - 5:  ·  Problem: Prophylactic measure.   ·  Plan: - Heparin sc.          CINDY Carmona DO [facility]  Cardiovascular Medicine  [address] [place] [address], [address]  Available via call or text on [employer] TEAMs  Office: [phone]

## 2024-02-27 NOTE — PROGRESS NOTE ADULT - SUBJECTIVE AND OBJECTIVE BOX
[facility] Division of Hospital Medicine  Gabrielle Schmidt MD  Available via MS Teams      SUBJECTIVE / OVERNIGHT EVENTS: No acute events overnight. Pt is in stretcher on med floor. Denies dizziness, palpitations no chest pain. Pt states that he is not on xarelto    ADDITIONAL REVIEW OF SYSTEMS: ROS reviewed and found to be negative    MEDICATIONS  (STANDING):  aspirin  chewable 81 milliGRAM(s) Oral daily  atorvastatin 40 milliGRAM(s) Oral at bedtime  carvedilol 25 milliGRAM(s) Oral every 12 hours  clopidogrel Tablet 75 milliGRAM(s) Oral daily  dextrose 5%. 1000 milliLiter(s) (100 mL/Hr) IV Continuous <Continuous>  dextrose 5%. 1000 milliLiter(s) (50 mL/Hr) IV Continuous <Continuous>  dextrose 50% Injectable 12.5 Gram(s) IV Push once  dextrose 50% Injectable 25 Gram(s) IV Push once  dextrose 50% Injectable 25 Gram(s) IV Push once  finasteride 5 milliGRAM(s) Oral daily  glucagon  Injectable 1 milliGRAM(s) IntraMuscular once  insulin lispro (ADMELOG) corrective regimen sliding scale   SubCutaneous three times a day before meals  insulin lispro (ADMELOG) corrective regimen sliding scale   SubCutaneous at bedtime  isosorbide   mononitrate ER Tablet (IMDUR) 60 milliGRAM(s) Oral daily  lidocaine   4% Patch 1 Patch Transdermal daily  losartan 100 milliGRAM(s) Oral daily    MEDICATIONS  (PRN):  acetaminophen     Tablet .. 650 milliGRAM(s) Oral every 6 hours PRN Temp greater or equal to 38C (100.4F), Mild Pain (1 - 3)  dextrose Oral Gel 15 Gram(s) Oral once PRN Blood Glucose LESS THAN 70 milliGRAM(s)/deciliter  polyethylene glycol 3350 17 Gram(s) Oral daily PRN for constipation      I&O's Summary    26 Feb 2024 07:01  -  27 Feb 2024 07:00  --------------------------------------------------------  IN: 0 mL / OUT: 650 mL / NET: -650 mL        PHYSICAL EXAM:  Vital Signs Last 24 Hrs  T(C): 36.3 (27 Feb 2024 13:03), Max: 37.1 (26 Feb 2024 16:00)  T(F): 97.4 (27 Feb 2024 13:03), Max: 98.7 (26 Feb 2024 16:00)  HR: 60 (27 Feb 2024 14:40) (51 - 69)  BP: 130/60 (27 Feb 2024 14:40) (130/60 - 175/79)  BP(mean): 87 (27 Feb 2024 14:40) (87 - 113)  RR: 17 (27 Feb 2024 13:03) (16 - 18)  SpO2: 98% (27 Feb 2024 13:03) (97% - 98%)    Parameters below as of 27 Feb 2024 13:03  Patient On (Oxygen Delivery Method): room air      CONSTITUTIONAL: NAD, well-developed  NECK: Supple  RESPIRATORY: Normal respiratory effort; lungs are clear to auscultation bilaterally  CARDIOVASCULAR: Regular rate and rhythm, normal S1 and S2  ABDOMEN: Nontender to palpation  MUSCULOSKELETAL:  No joint swelling or tenderness to palpation  PSYCH: A+O to person, place, and time; affect appropriate  SKIN: No rashes    LABS:                        12.6   8.39  )-----------( 373      ( 27 Feb 2024 00:46 )             36.3     02-27    139  |  106  |  33<H>  ----------------------------<  99  4.3   |  22  |  0.94    Ca    9.5      27 Feb 2024 00:46  Mg     1.5     02-27    TPro  6.8  /  Alb  3.9  /  TBili  0.2  /  DBili  x   /  AST  12  /  ALT  15  /  AlkPhos  89  02-26      CARDIAC MARKERS ( 26 Feb 2024 08:56 )  x     / x     / 89 U/L / x     / 2.1 ng/mL

## 2024-02-28 LAB
ANION GAP SERPL CALC-SCNC: 12 MMOL/L — SIGNIFICANT CHANGE UP (ref 5–17)
BUN SERPL-MCNC: 33 MG/DL — HIGH (ref 7–23)
CALCIUM SERPL-MCNC: 9.5 MG/DL — SIGNIFICANT CHANGE UP (ref 8.4–10.5)
CHLORIDE SERPL-SCNC: 102 MMOL/L — SIGNIFICANT CHANGE UP (ref 96–108)
CO2 SERPL-SCNC: 22 MMOL/L — SIGNIFICANT CHANGE UP (ref 22–31)
CREAT SERPL-MCNC: 0.93 MG/DL — SIGNIFICANT CHANGE UP (ref 0.5–1.3)
EGFR: 83 ML/MIN/1.73M2 — SIGNIFICANT CHANGE UP
GLUCOSE BLDC GLUCOMTR-MCNC: 105 MG/DL — HIGH (ref 70–99)
GLUCOSE BLDC GLUCOMTR-MCNC: 116 MG/DL — HIGH (ref 70–99)
GLUCOSE BLDC GLUCOMTR-MCNC: 119 MG/DL — HIGH (ref 70–99)
GLUCOSE BLDC GLUCOMTR-MCNC: 155 MG/DL — HIGH (ref 70–99)
GLUCOSE SERPL-MCNC: 105 MG/DL — HIGH (ref 70–99)
HCT VFR BLD CALC: 36.6 % — LOW (ref 39–50)
HGB BLD-MCNC: 12.4 G/DL — LOW (ref 13–17)
MAGNESIUM SERPL-MCNC: 1.9 MG/DL — SIGNIFICANT CHANGE UP (ref 1.6–2.6)
MCHC RBC-ENTMCNC: 30.9 PG — SIGNIFICANT CHANGE UP (ref 27–34)
MCHC RBC-ENTMCNC: 33.9 GM/DL — SIGNIFICANT CHANGE UP (ref 32–36)
MCV RBC AUTO: 91.3 FL — SIGNIFICANT CHANGE UP (ref 80–100)
NRBC # BLD: 0 /100 WBCS — SIGNIFICANT CHANGE UP (ref 0–0)
PHOSPHATE SERPL-MCNC: 3 MG/DL — SIGNIFICANT CHANGE UP (ref 2.5–4.5)
PLATELET # BLD AUTO: 355 K/UL — SIGNIFICANT CHANGE UP (ref 150–400)
POTASSIUM SERPL-MCNC: 4 MMOL/L — SIGNIFICANT CHANGE UP (ref 3.5–5.3)
POTASSIUM SERPL-SCNC: 4 MMOL/L — SIGNIFICANT CHANGE UP (ref 3.5–5.3)
RBC # BLD: 4.01 M/UL — LOW (ref 4.2–5.8)
RBC # FLD: 11.7 % — SIGNIFICANT CHANGE UP (ref 10.3–14.5)
SODIUM SERPL-SCNC: 136 MMOL/L — SIGNIFICANT CHANGE UP (ref 135–145)
T3 SERPL-MCNC: 107 NG/DL — SIGNIFICANT CHANGE UP (ref 80–200)
T4 AB SER-ACNC: 7.9 UG/DL — SIGNIFICANT CHANGE UP (ref 4.6–12)
TSH SERPL-MCNC: 1.12 UIU/ML — SIGNIFICANT CHANGE UP (ref 0.27–4.2)
WBC # BLD: 7.39 K/UL — SIGNIFICANT CHANGE UP (ref 3.8–10.5)
WBC # FLD AUTO: 7.39 K/UL — SIGNIFICANT CHANGE UP (ref 3.8–10.5)

## 2024-02-28 RX ORDER — SODIUM CHLORIDE 9 MG/ML
250 INJECTION INTRAMUSCULAR; INTRAVENOUS; SUBCUTANEOUS ONCE
Refills: 0 | Status: COMPLETED | OUTPATIENT
Start: 2024-02-28 | End: 2024-02-28

## 2024-02-28 RX ORDER — ACETAMINOPHEN 500 MG
650 TABLET ORAL ONCE
Refills: 0 | Status: COMPLETED | OUTPATIENT
Start: 2024-02-28 | End: 2024-02-28

## 2024-02-28 RX ORDER — CHLORHEXIDINE GLUCONATE 213 G/1000ML
1 SOLUTION TOPICAL
Refills: 0 | Status: DISCONTINUED | OUTPATIENT
Start: 2024-02-28 | End: 2024-03-04

## 2024-02-28 RX ORDER — LANOLIN ALCOHOL/MO/W.PET/CERES
1 CREAM (GRAM) TOPICAL AT BEDTIME
Refills: 0 | Status: DISCONTINUED | OUTPATIENT
Start: 2024-02-28 | End: 2024-03-04

## 2024-02-28 RX ORDER — SODIUM CHLORIDE 9 MG/ML
1000 INJECTION INTRAMUSCULAR; INTRAVENOUS; SUBCUTANEOUS
Refills: 0 | Status: DISCONTINUED | OUTPATIENT
Start: 2024-02-28 | End: 2024-03-04

## 2024-02-28 RX ADMIN — Medication 1 MILLIGRAM(S): at 23:24

## 2024-02-28 RX ADMIN — Medication 650 MILLIGRAM(S): at 23:24

## 2024-02-28 RX ADMIN — CLOPIDOGREL BISULFATE 75 MILLIGRAM(S): 75 TABLET, FILM COATED ORAL at 12:51

## 2024-02-28 RX ADMIN — LIDOCAINE 1 PATCH: 4 CREAM TOPICAL at 12:20

## 2024-02-28 RX ADMIN — CARVEDILOL PHOSPHATE 25 MILLIGRAM(S): 80 CAPSULE, EXTENDED RELEASE ORAL at 21:51

## 2024-02-28 RX ADMIN — Medication 650 MILLIGRAM(S): at 20:56

## 2024-02-28 RX ADMIN — Medication 650 MILLIGRAM(S): at 21:10

## 2024-02-28 RX ADMIN — LIDOCAINE 1 PATCH: 4 CREAM TOPICAL at 03:29

## 2024-02-28 RX ADMIN — LOSARTAN POTASSIUM 100 MILLIGRAM(S): 100 TABLET, FILM COATED ORAL at 08:01

## 2024-02-28 RX ADMIN — POLYETHYLENE GLYCOL 3350 17 GRAM(S): 17 POWDER, FOR SOLUTION ORAL at 11:11

## 2024-02-28 RX ADMIN — Medication 650 MILLIGRAM(S): at 00:24

## 2024-02-28 RX ADMIN — Medication 81 MILLIGRAM(S): at 12:51

## 2024-02-28 RX ADMIN — ISOSORBIDE MONONITRATE 60 MILLIGRAM(S): 60 TABLET, EXTENDED RELEASE ORAL at 21:51

## 2024-02-28 RX ADMIN — SODIUM CHLORIDE 500 MILLILITER(S): 9 INJECTION INTRAMUSCULAR; INTRAVENOUS; SUBCUTANEOUS at 16:43

## 2024-02-28 RX ADMIN — SODIUM CHLORIDE 75 MILLILITER(S): 9 INJECTION INTRAMUSCULAR; INTRAVENOUS; SUBCUTANEOUS at 16:43

## 2024-02-28 RX ADMIN — ATORVASTATIN CALCIUM 40 MILLIGRAM(S): 80 TABLET, FILM COATED ORAL at 21:51

## 2024-02-28 RX ADMIN — SODIUM CHLORIDE 75 MILLILITER(S): 9 INJECTION INTRAMUSCULAR; INTRAVENOUS; SUBCUTANEOUS at 19:01

## 2024-02-28 RX ADMIN — Medication 1: at 12:50

## 2024-02-28 RX ADMIN — FINASTERIDE 5 MILLIGRAM(S): 5 TABLET, FILM COATED ORAL at 12:51

## 2024-02-28 NOTE — PROGRESS NOTE ADULT - SUBJECTIVE AND OBJECTIVE BOX
DATE OF SERVICE: 02-28-24 @ 13:15    Patient is a 78y old  Male who presents with a chief complaint of chest pain (28 Feb 2024 11:43)      INTERVAL HISTORY: feels okay    REVIEW OF SYSTEMS:  CONSTITUTIONAL: No weakness  EYES/ENT: No visual changes;  No throat pain   NECK: No pain or stiffness  RESPIRATORY: No cough, wheezing; No shortness of breath  CARDIOVASCULAR: No chest pain or palpitations  GASTROINTESTINAL: No abdominal  pain. No nausea, vomiting, or hematemesis  GENITOURINARY: No dysuria, frequency or hematuria  NEUROLOGICAL: No stroke like symptoms  SKIN: No rashes    TELEMETRY Personally reviewed: SB/SR 50s-60s  	  MEDICATIONS:  carvedilol 25 milliGRAM(s) Oral every 12 hours  isosorbide   mononitrate ER Tablet (IMDUR) 60 milliGRAM(s) Oral daily  losartan 100 milliGRAM(s) Oral daily        PHYSICAL EXAM:  T(C): 36.7 (02-28-24 @ 11:51), Max: 36.7 (02-28-24 @ 11:51)  HR: 57 (02-28-24 @ 11:51) (52 - 62)  BP: 97/60 (02-28-24 @ 11:51) (94/53 - 145/70)  RR: 18 (02-28-24 @ 11:51) (18 - 18)  SpO2: 98% (02-28-24 @ 11:51) (95% - 98%)  Wt(kg): --  I&O's Summary    28 Feb 2024 07:01  -  28 Feb 2024 13:15  --------------------------------------------------------  IN: 240 mL / OUT: 650 mL / NET: -410 mL          Appearance: In no distress	  HEENT:    PERRL, EOMI	  Cardiovascular:  S1 S2, No JVD  Respiratory: Lungs clear to auscultation	  Gastrointestinal:  Soft, Non-tender, + BS	  Vascularature:  No edema of LE  Psychiatric: Appropriate affect   Neuro: no acute focal deficits                               12.4   7.39  )-----------( 355      ( 28 Feb 2024 06:41 )             36.6     02-28    136  |  102  |  33<H>  ----------------------------<  105<H>  4.0   |  22  |  0.93    Ca    9.5      28 Feb 2024 06:41  Phos  3.0     02-28  Mg     1.9     02-28          Labs personally reviewed      ASSESSMENT/PLAN: 	  78-year-old male history of  CAD s/p PCI (last 2021 to OM1), HTN, HLD, DM2, BPH presents with chest pain at rest from rehab admitted for ischemic evaluation        Problem/Plan - 1:  ·  Problem: Atypical chest pain.   ·  Plan: sharp chest pain at rest and reproducible, no exertional chest pain with PT at rehab, EKG unchanged and Troponin 31 and 28  - TTE limited windows but unremarkable  - NM stress test with large area of ischemia, plan for cardiac cath today 2/28  - tele monitor for arrhythmia       Problem/Plan - 2:  ·  Problem: CAD S/P percutaneous coronary angioplasty.   ·  Plan: - will continue ASA, Plavix, Coreg, Imdur, and statin.    Problem/Plan - 3:  ·  Problem: Diabetes mellitus.   ·  Plan: NISS  - will follow up A1C.    Problem/Plan - 4:  ·  Problem: HTN   ·  Plan: Will continue Coreg, Imdur and Losartan    Problem/Plan - 5:  ·  Problem: Prophylactic measure.   ·  Plan: - Heparin sc.                Iolani Behrbom, AG-NP   Sreedhar Cordon DO [facility]  Cardiovascular Medicine  [address] [place] [address], [address]  Available through call or text on [employer] TEAMs  Office: [phone]   DATE OF SERVICE: 02-28-24 @ 13:15    Patient is a 78y old  Male who presents with a chief complaint of chest pain (28 Feb 2024 11:43)      INTERVAL HISTORY: feels okay    REVIEW OF SYSTEMS:  CONSTITUTIONAL: No weakness  EYES/ENT: No visual changes;  No throat pain   NECK: No pain or stiffness  RESPIRATORY: No cough, wheezing; No shortness of breath  CARDIOVASCULAR: No chest pain or palpitations  GASTROINTESTINAL: No abdominal  pain. No nausea, vomiting, or hematemesis  GENITOURINARY: No dysuria, frequency or hematuria  NEUROLOGICAL: No stroke like symptoms  SKIN: No rashes    TELEMETRY Personally reviewed: SB/SR 50s-60s  	  MEDICATIONS:  carvedilol 25 milliGRAM(s) Oral every 12 hours  isosorbide   mononitrate ER Tablet (IMDUR) 60 milliGRAM(s) Oral daily  losartan 100 milliGRAM(s) Oral daily        PHYSICAL EXAM:  T(C): 36.7 (02-28-24 @ 11:51), Max: 36.7 (02-28-24 @ 11:51)  HR: 57 (02-28-24 @ 11:51) (52 - 62)  BP: 97/60 (02-28-24 @ 11:51) (94/53 - 145/70)  RR: 18 (02-28-24 @ 11:51) (18 - 18)  SpO2: 98% (02-28-24 @ 11:51) (95% - 98%)  Wt(kg): --  I&O's Summary    28 Feb 2024 07:01  -  28 Feb 2024 13:15  --------------------------------------------------------  IN: 240 mL / OUT: 650 mL / NET: -410 mL          Appearance: In no distress	  HEENT:    PERRL, EOMI	  Cardiovascular:  S1 S2, No JVD  Respiratory: Lungs clear to auscultation	  Gastrointestinal:  Soft, Non-tender, + BS	  Vascularature:  No edema of LE  Psychiatric: Appropriate affect   Neuro: no acute focal deficits                               12.4   7.39  )-----------( 355      ( 28 Feb 2024 06:41 )             36.6     02-28    136  |  102  |  33<H>  ----------------------------<  105<H>  4.0   |  22  |  0.93    Ca    9.5      28 Feb 2024 06:41  Phos  3.0     02-28  Mg     1.9     02-28          Labs personally reviewed      ASSESSMENT/PLAN: 	  78-year-old male history of  CAD s/p PCI (last 2021 to OM1), HTN, HLD, DM2, BPH presents with chest pain at rest from rehab admitted for ischemic evaluation        Problem/Plan - 1:  ·  Problem: Atypical chest pain.   ·  Plan: sharp chest pain at rest and reproducible, no exertional chest pain with PT at rehab, EKG unchanged and Troponin 31 and 28  - TTE limited windows but unremarkable  - NM stress test with large area of ischemia  - s/p cardiac cath 2/28 with PCI to mLCx  - tele monitor for arrhythmia    Problem/Plan - 2:  ·  Problem: CAD S/P percutaneous coronary angioplasty.   ·  Plan: - will continue ASA, Plavix, Coreg, Imdur, and statin.    Problem/Plan - 3:  ·  Problem: Diabetes mellitus.   ·  Plan: NISS  - will follow up A1C.    Problem/Plan - 4:  ·  Problem: HTN   ·  Plan: Will continue Coreg, Imdur and Losartan    Problem/Plan - 5:  ·  Problem: Prophylactic measure.   ·  Plan: - Heparin sc.                Iolani Behrbom, AG-BERNARDO Cordon DO [facility]  Cardiovascular Medicine  [address] [place] [address], [address]  Available through call or text on [employer] TEAMs  Office: [phone]

## 2024-02-28 NOTE — PROGRESS NOTE ADULT - SUBJECTIVE AND OBJECTIVE BOX
[facility] Division of Hospital Medicine  Gabrielle Schmidt MD  Available via MS Teams      SUBJECTIVE / OVERNIGHT EVENTS: No acute events overnight. Pt denies chest pain, palpitations.    ADDITIONAL REVIEW OF SYSTEMS:    MEDICATIONS  (STANDING):  aspirin  chewable 81 milliGRAM(s) Oral daily  atorvastatin 40 milliGRAM(s) Oral at bedtime  carvedilol 25 milliGRAM(s) Oral every 12 hours  clopidogrel Tablet 75 milliGRAM(s) Oral daily  dextrose 5%. 1000 milliLiter(s) (50 mL/Hr) IV Continuous <Continuous>  dextrose 5%. 1000 milliLiter(s) (100 mL/Hr) IV Continuous <Continuous>  dextrose 50% Injectable 25 Gram(s) IV Push once  dextrose 50% Injectable 25 Gram(s) IV Push once  dextrose 50% Injectable 12.5 Gram(s) IV Push once  finasteride 5 milliGRAM(s) Oral daily  glucagon  Injectable 1 milliGRAM(s) IntraMuscular once  insulin lispro (ADMELOG) corrective regimen sliding scale   SubCutaneous three times a day before meals  insulin lispro (ADMELOG) corrective regimen sliding scale   SubCutaneous at bedtime  isosorbide   mononitrate ER Tablet (IMDUR) 60 milliGRAM(s) Oral daily  lidocaine   4% Patch 1 Patch Transdermal daily  losartan 100 milliGRAM(s) Oral daily  melatonin 1 milliGRAM(s) Oral at bedtime    MEDICATIONS  (PRN):  acetaminophen     Tablet .. 650 milliGRAM(s) Oral every 6 hours PRN Temp greater or equal to 38C (100.4F), Mild Pain (1 - 3)  dextrose Oral Gel 15 Gram(s) Oral once PRN Blood Glucose LESS THAN 70 milliGRAM(s)/deciliter  polyethylene glycol 3350 17 Gram(s) Oral daily PRN for constipation      I&O's Summary    28 Feb 2024 07:01  -  28 Feb 2024 11:43  --------------------------------------------------------  IN: 240 mL / OUT: 650 mL / NET: -410 mL        PHYSICAL EXAM:  Vital Signs Last 24 Hrs  T(C): 36.3 (27 Feb 2024 21:16), Max: 36.6 (27 Feb 2024 15:53)  T(F): 97.4 (27 Feb 2024 21:16), Max: 97.9 (27 Feb 2024 15:53)  HR: 52 (28 Feb 2024 06:55) (52 - 62)  BP: 145/70 (28 Feb 2024 06:55) (94/53 - 161/70)  BP(mean): 87 (27 Feb 2024 14:40) (87 - 100)  RR: 18 (27 Feb 2024 21:16) (17 - 18)  SpO2: 96% (27 Feb 2024 21:16) (95% - 98%)    Parameters below as of 27 Feb 2024 21:16  Patient On (Oxygen Delivery Method): room air      CONSTITUTIONAL: NAD, well-developed, sitting up in chair  NECK: Supple  RESPIRATORY: Normal respiratory effort; lungs are clear to auscultation bilaterally  CARDIOVASCULAR: Regular rate and rhythm, normal S1 and S2  ABDOMEN: Nontender to palpation  MUSCULOSKELETAL:  No joint swelling or tenderness to palpation  PSYCH: A+O to person, place, and time; affect appropriate  SKIN: No rashes    LABS:                        12.4   7.39  )-----------( 355      ( 28 Feb 2024 06:41 )             36.6     02-28    136  |  102  |  33<H>  ----------------------------<  105<H>  4.0   |  22  |  0.93    Ca    9.5      28 Feb 2024 06:41  Phos  3.0     02-28  Mg     1.9     02-28

## 2024-02-28 NOTE — PROVIDER CONTACT NOTE (OTHER) - ASSESSMENT
Pt a&ox4. VSS: /73, HR 72, O2 93% on room air. Pt asleep during event. Pt asymptomatic: denies chest pain or palpitations.

## 2024-02-29 LAB
A1C WITH ESTIMATED AVERAGE GLUCOSE RESULT: 6.4 % — HIGH (ref 4–5.6)
ANION GAP SERPL CALC-SCNC: 12 MMOL/L — SIGNIFICANT CHANGE UP (ref 5–17)
BUN SERPL-MCNC: 35 MG/DL — HIGH (ref 7–23)
CALCIUM SERPL-MCNC: 9.3 MG/DL — SIGNIFICANT CHANGE UP (ref 8.4–10.5)
CHLORIDE SERPL-SCNC: 106 MMOL/L — SIGNIFICANT CHANGE UP (ref 96–108)
CO2 SERPL-SCNC: 21 MMOL/L — LOW (ref 22–31)
CREAT SERPL-MCNC: 0.89 MG/DL — SIGNIFICANT CHANGE UP (ref 0.5–1.3)
EGFR: 87 ML/MIN/1.73M2 — SIGNIFICANT CHANGE UP
ESTIMATED AVERAGE GLUCOSE: 137 MG/DL — HIGH (ref 68–114)
GLUCOSE BLDC GLUCOMTR-MCNC: 109 MG/DL — HIGH (ref 70–99)
GLUCOSE BLDC GLUCOMTR-MCNC: 146 MG/DL — HIGH (ref 70–99)
GLUCOSE BLDC GLUCOMTR-MCNC: 152 MG/DL — HIGH (ref 70–99)
GLUCOSE BLDC GLUCOMTR-MCNC: 164 MG/DL — HIGH (ref 70–99)
GLUCOSE SERPL-MCNC: 108 MG/DL — HIGH (ref 70–99)
HCT VFR BLD CALC: 34.7 % — LOW (ref 39–50)
HGB BLD-MCNC: 11.6 G/DL — LOW (ref 13–17)
MAGNESIUM SERPL-MCNC: 1.8 MG/DL — SIGNIFICANT CHANGE UP (ref 1.6–2.6)
MCHC RBC-ENTMCNC: 31.2 PG — SIGNIFICANT CHANGE UP (ref 27–34)
MCHC RBC-ENTMCNC: 33.4 GM/DL — SIGNIFICANT CHANGE UP (ref 32–36)
MCV RBC AUTO: 93.3 FL — SIGNIFICANT CHANGE UP (ref 80–100)
MRSA PCR RESULT.: SIGNIFICANT CHANGE UP
NRBC # BLD: 0 /100 WBCS — SIGNIFICANT CHANGE UP (ref 0–0)
PHOSPHATE SERPL-MCNC: 3 MG/DL — SIGNIFICANT CHANGE UP (ref 2.5–4.5)
PLATELET # BLD AUTO: 272 K/UL — SIGNIFICANT CHANGE UP (ref 150–400)
POTASSIUM SERPL-MCNC: 4.4 MMOL/L — SIGNIFICANT CHANGE UP (ref 3.5–5.3)
POTASSIUM SERPL-SCNC: 4.4 MMOL/L — SIGNIFICANT CHANGE UP (ref 3.5–5.3)
RBC # BLD: 3.72 M/UL — LOW (ref 4.2–5.8)
RBC # FLD: 11.9 % — SIGNIFICANT CHANGE UP (ref 10.3–14.5)
S AUREUS DNA NOSE QL NAA+PROBE: SIGNIFICANT CHANGE UP
SODIUM SERPL-SCNC: 139 MMOL/L — SIGNIFICANT CHANGE UP (ref 135–145)
WBC # BLD: 9.3 K/UL — SIGNIFICANT CHANGE UP (ref 3.8–10.5)
WBC # FLD AUTO: 9.3 K/UL — SIGNIFICANT CHANGE UP (ref 3.8–10.5)

## 2024-02-29 RX ADMIN — Medication 81 MILLIGRAM(S): at 11:46

## 2024-02-29 RX ADMIN — CARVEDILOL PHOSPHATE 25 MILLIGRAM(S): 80 CAPSULE, EXTENDED RELEASE ORAL at 17:30

## 2024-02-29 RX ADMIN — LIDOCAINE 1 PATCH: 4 CREAM TOPICAL at 17:32

## 2024-02-29 RX ADMIN — Medication 650 MILLIGRAM(S): at 11:47

## 2024-02-29 RX ADMIN — ISOSORBIDE MONONITRATE 60 MILLIGRAM(S): 60 TABLET, EXTENDED RELEASE ORAL at 11:47

## 2024-02-29 RX ADMIN — ATORVASTATIN CALCIUM 40 MILLIGRAM(S): 80 TABLET, FILM COATED ORAL at 21:21

## 2024-02-29 RX ADMIN — Medication 1 MILLIGRAM(S): at 22:44

## 2024-02-29 RX ADMIN — CLOPIDOGREL BISULFATE 75 MILLIGRAM(S): 75 TABLET, FILM COATED ORAL at 11:46

## 2024-02-29 RX ADMIN — LIDOCAINE 1 PATCH: 4 CREAM TOPICAL at 04:07

## 2024-02-29 RX ADMIN — Medication 1: at 13:16

## 2024-02-29 RX ADMIN — LIDOCAINE 1 PATCH: 4 CREAM TOPICAL at 11:53

## 2024-02-29 RX ADMIN — LIDOCAINE 1 PATCH: 4 CREAM TOPICAL at 04:08

## 2024-02-29 RX ADMIN — FINASTERIDE 5 MILLIGRAM(S): 5 TABLET, FILM COATED ORAL at 11:46

## 2024-02-29 RX ADMIN — Medication 650 MILLIGRAM(S): at 13:11

## 2024-02-29 RX ADMIN — LOSARTAN POTASSIUM 100 MILLIGRAM(S): 100 TABLET, FILM COATED ORAL at 05:15

## 2024-02-29 RX ADMIN — CHLORHEXIDINE GLUCONATE 1 APPLICATION(S): 213 SOLUTION TOPICAL at 05:17

## 2024-02-29 NOTE — PROGRESS NOTE ADULT - SUBJECTIVE AND OBJECTIVE BOX
Interventional Cardiology Post Cath Progress Note:                Subjective: Chest pain  Patient feels well- no current complaints- Denies chest pain, shortness of breath. Denies pain, numbness, tingling or swelling around (groin/wrist) access site    Tele 24hrs: SB @ 55 bpm      MEDICATIONS  (STANDING):  aspirin  chewable 81 milliGRAM(s) Oral daily  atorvastatin 40 milliGRAM(s) Oral at bedtime  carvedilol 25 milliGRAM(s) Oral every 12 hours  chlorhexidine 2% Cloths 1 Application(s) Topical <User Schedule>  clopidogrel Tablet 75 milliGRAM(s) Oral daily  dextrose 5%. 1000 milliLiter(s) (100 mL/Hr) IV Continuous <Continuous>  dextrose 5%. 1000 milliLiter(s) (50 mL/Hr) IV Continuous <Continuous>  dextrose 50% Injectable 25 Gram(s) IV Push once  dextrose 50% Injectable 25 Gram(s) IV Push once  dextrose 50% Injectable 12.5 Gram(s) IV Push once  finasteride 5 milliGRAM(s) Oral daily  glucagon  Injectable 1 milliGRAM(s) IntraMuscular once  insulin lispro (ADMELOG) corrective regimen sliding scale   SubCutaneous three times a day before meals  insulin lispro (ADMELOG) corrective regimen sliding scale   SubCutaneous at bedtime  isosorbide   mononitrate ER Tablet (IMDUR) 60 milliGRAM(s) Oral daily  lidocaine   4% Patch 1 Patch Transdermal daily  losartan 100 milliGRAM(s) Oral daily  melatonin 1 milliGRAM(s) Oral at bedtime  sodium chloride 0.9%. 1000 milliLiter(s) (75 mL/Hr) IV Continuous <Continuous>  sodium chloride 0.9%. 1000 milliLiter(s) (75 mL/Hr) IV Continuous <Continuous>    MEDICATIONS  (PRN):  acetaminophen     Tablet .. 650 milliGRAM(s) Oral every 6 hours PRN Temp greater or equal to 38C (100.4F), Mild Pain (1 - 3)  dextrose Oral Gel 15 Gram(s) Oral once PRN Blood Glucose LESS THAN 70 milliGRAM(s)/deciliter  polyethylene glycol 3350 17 Gram(s) Oral daily PRN for constipation      Objective:  Vital Signs Last 24 Hrs  T(C): 36.4 (29 Feb 2024 04:20), Max: 36.7 (28 Feb 2024 11:51)  T(F): 97.6 (29 Feb 2024 04:20), Max: 98.1 (28 Feb 2024 11:51)  HR: 56 (29 Feb 2024 04:20) (56 - 72)  BP: 126/63 (29 Feb 2024 04:20) (97/60 - 194/86)  BP(mean): --  RR: 18 (29 Feb 2024 04:20) (16 - 18)  SpO2: 95% (29 Feb 2024 04:20) (94% - 99%)    Parameters below as of 29 Feb 2024 04:20  Patient On (Oxygen Delivery Method): room air        02-28-24 @ 07:01  -  02-29-24 @ 07:00  --------------------------------------------------------  IN: 240 mL / OUT: 1500 mL / NET: -1260 mL                     11.6   9.30  )-----------( 272      ( 29 Feb 2024 07:37 )             34.7     02-29    139  |  106  |  35<H>  ----------------------------<  108<H>  4.4   |  21<L>  |  0.89    Ca    9.3      29 Feb 2024 07:38  Phos  3.0     02-29  Mg     1.8     02-29        Urinalysis Basic - ( 29 Feb 2024 07:38 )    Color: x / Appearance: x / SG: x / pH: x  Gluc: 108 mg/dL / Ketone: x  / Bili: x / Urobili: x   Blood: x / Protein: x / Nitrite: x   Leuk Esterase: x / RBC: x / WBC x   Sq Epi: x / Non Sq Epi: x / Bacteria: x      Physical Exam:  No apparent distress, alert and oriented times three, appropriate affect  JVD is not elevated, supple  Clear to auscultation with no wheezing, ronchi or crackles  Regular rate and rhythm with no murmur, rub or gallop  Positive bowel sounds, soft, non-tender, non-distended, no masses/guarding or rebound tenderness  Right Upper Extremity: Soft, non tender, no bleeding or hematoma, clean/dry/intact- RUE +2 palpable radial pulse  No clubbing, cyanosis or edema      Assessment/Plan: 78y Male PMH CAD s/p PCI (last 2021 to OM1), HTN, HLD, DM2, BPH.  2/28 S/P PCI/CHARITY to mLCx x 1 via RRA      - Right radial site stable.   - Continue DAPT (aspirin 81mg and clopidogrel 75mg)  - Continue atorvastatin  - EF Normal  - Recommend a heart healthy diet which includes a variety of fruits and vegetables, whole grains, low fat dairy products, legumes and skinless poulty and fish; food prepared with little or no salt and minimize processed foods  - Avoid using NSAIDs  (Aleve, Motrin, ibuprofen, naproxen) while on DAPT, please utilize Tylenol for pain control (not to exceed 4gm in 24 hours)  - Care per primary team    Please check [email or web address] password [email or web address] for cardiology service schedule and contact information via TEAMS.

## 2024-02-29 NOTE — PROGRESS NOTE ADULT - SUBJECTIVE AND OBJECTIVE BOX
DATE OF SERVICE: 02-29-24 @ 13:39    Patient is a 78y old  Male who presents with a chief complaint of chest pain (29 Feb 2024 12:50)      INTERVAL HISTORY:     REVIEW OF SYSTEMS:  CONSTITUTIONAL: No weakness  EYES/ENT: No visual changes;  No throat pain   NECK: No pain or stiffness  RESPIRATORY: No cough, wheezing; No shortness of breath  CARDIOVASCULAR: No chest pain or palpitations  GASTROINTESTINAL: No abdominal  pain. No nausea, vomiting, or hematemesis  GENITOURINARY: No dysuria, frequency or hematuria  NEUROLOGICAL: No stroke like symptoms  SKIN: No rashes    TELEMETRY Personally reviewed: SR 50-70  	  MEDICATIONS:  carvedilol 25 milliGRAM(s) Oral every 12 hours  isosorbide   mononitrate ER Tablet (IMDUR) 60 milliGRAM(s) Oral daily  losartan 100 milliGRAM(s) Oral daily        PHYSICAL EXAM:  T(C): 36.7 (02-29-24 @ 11:43), Max: 36.7 (02-28-24 @ 21:32)  HR: 60 (02-29-24 @ 11:43) (56 - 72)  BP: 101/61 (02-29-24 @ 11:43) (101/61 - 194/86)  RR: 18 (02-29-24 @ 11:43) (16 - 18)  SpO2: 97% (02-29-24 @ 11:43) (94% - 99%)  Wt(kg): --  I&O's Summary    28 Feb 2024 07:01  -  29 Feb 2024 07:00  --------------------------------------------------------  IN: 240 mL / OUT: 1500 mL / NET: -1260 mL      Height (cm): 162.6 (02-28 @ 16:20)  Weight (kg): 68 (02-28 @ 16:20)  BMI (kg/m2): 25.7 (02-28 @ 16:20)  BSA (m2): 1.73 (02-28 @ 16:20)    Appearance: In no distress	  HEENT:    PERRL, EOMI	  Cardiovascular:  S1 S2, No JVD  Respiratory: Lungs clear to auscultation	  Gastrointestinal:  Soft, Non-tender, + BS	  Vascularature:  No edema of LE  Psychiatric: Appropriate affect   Neuro: no acute focal deficits                               11.6   9.30  )-----------( 272      ( 29 Feb 2024 07:37 )             34.7     02-29    139  |  106  |  35<H>  ----------------------------<  108<H>  4.4   |  21<L>  |  0.89    Ca    9.3      29 Feb 2024 07:38  Phos  3.0     02-29  Mg     1.8     02-29          Labs personally reviewed      ASSESSMENT/PLAN: 	  78-year-old male history of  CAD s/p PCI (last 2021 to OM1), HTN, HLD, DM2, BPH presents with chest pain at rest from rehab admitted for ischemic evaluation        Problem/Plan - 1:  ·  Problem: Atypical chest pain.   ·  Plan: sharp chest pain at rest and reproducible, no exertional chest pain with PT at rehab, EKG unchanged and Troponin 31 and 28  - TTE limited windows but unremarkable  - NM stress test with large area of ischemia  - s/p cardiac cath 2/28 with PCI to mLCx  - c/w ASA 81mg PO, Plavix 75mg PO and Atorvastatin 40mg PO daily    Problem/Plan - 2:  ·  Problem: CAD S/P percutaneous coronary angioplasty.   ·  Plan: - will continue ASA, Plavix, Coreg, Imdur, and statin.    Problem/Plan - 3:  ·  Problem: Diabetes mellitus.   ·  Plan: NISS  - will follow up A1C.    Problem/Plan - 4:  ·  Problem: HTN   ·  Plan: Will continue Coreg, Imdur and Losartan    Problem/Plan - 5:  ·  Problem: Prophylactic measure.   ·  Plan: - Heparin sc.          ALVAREZ Hunt-NP   Sreedhar Cordon DO [facility]  Cardiovascular Medicine  [address] [facility], [address]  Available through call or text on [employer] TE[employer]  Office: [phone]   DATE OF SERVICE: 02-29-24 @ 13:39    Patient is a 78y old  Male who presents with a chief complaint of chest pain (29 Feb 2024 12:50)      INTERVAL HISTORY: Feels ok.     REVIEW OF SYSTEMS:  CONSTITUTIONAL: No weakness  EYES/ENT: No visual changes;  No throat pain   NECK: No pain or stiffness  RESPIRATORY: No cough, wheezing; No shortness of breath  CARDIOVASCULAR: No chest pain or palpitations  GASTROINTESTINAL: No abdominal  pain. No nausea, vomiting, or hematemesis  GENITOURINARY: No dysuria, frequency or hematuria  NEUROLOGICAL: No stroke like symptoms  SKIN: No rashes    TELEMETRY Personally reviewed: SR 50-70  	  MEDICATIONS:  carvedilol 25 milliGRAM(s) Oral every 12 hours  isosorbide   mononitrate ER Tablet (IMDUR) 60 milliGRAM(s) Oral daily  losartan 100 milliGRAM(s) Oral daily        PHYSICAL EXAM:  T(C): 36.7 (02-29-24 @ 11:43), Max: 36.7 (02-28-24 @ 21:32)  HR: 60 (02-29-24 @ 11:43) (56 - 72)  BP: 101/61 (02-29-24 @ 11:43) (101/61 - 194/86)  RR: 18 (02-29-24 @ 11:43) (16 - 18)  SpO2: 97% (02-29-24 @ 11:43) (94% - 99%)  Wt(kg): --  I&O's Summary    28 Feb 2024 07:01  -  29 Feb 2024 07:00  --------------------------------------------------------  IN: 240 mL / OUT: 1500 mL / NET: -1260 mL      Height (cm): 162.6 (02-28 @ 16:20)  Weight (kg): 68 (02-28 @ 16:20)  BMI (kg/m2): 25.7 (02-28 @ 16:20)  BSA (m2): 1.73 (02-28 @ 16:20)    Appearance: In no distress	  HEENT:    PERRL, EOMI	  Cardiovascular:  S1 S2, No JVD  Respiratory: Lungs clear to auscultation	  Gastrointestinal:  Soft, Non-tender, + BS	  Vascularature:  No edema of LE  Psychiatric: Appropriate affect   Neuro: no acute focal deficits                               11.6   9.30  )-----------( 272      ( 29 Feb 2024 07:37 )             34.7     02-29    139  |  106  |  35<H>  ----------------------------<  108<H>  4.4   |  21<L>  |  0.89    Ca    9.3      29 Feb 2024 07:38  Phos  3.0     02-29  Mg     1.8     02-29          Labs personally reviewed      ASSESSMENT/PLAN: 	  78-year-old male history of  CAD s/p PCI (last 2021 to OM1), HTN, HLD, DM2, BPH presents with chest pain at rest from rehab admitted for ischemic evaluation        Problem/Plan - 1:  ·  Problem: Atypical chest pain.   ·  Plan: sharp chest pain at rest and reproducible, no exertional chest pain with PT at rehab, EKG unchanged and Troponin 31 and 28  - TTE limited windows but unremarkable  - NM stress test with large area of ischemia  - s/p cardiac cath 2/28 with PCI to mLCx  - c/w ASA 81mg PO, Plavix 75mg PO and Atorvastatin 40mg PO daily    Problem/Plan - 2:  ·  Problem: CAD S/P percutaneous coronary angioplasty.   ·  Plan: - will continue ASA, Plavix, Coreg, Imdur, and statin.    Problem/Plan - 3:  ·  Problem: Diabetes mellitus.   ·  Plan: NISS  - will follow up A1C.    Problem/Plan - 4:  ·  Problem: HTN   ·  Plan: Will continue Coreg, Imdur and Losartan    Problem/Plan - 5:  ·  Problem: Prophylactic measure.   ·  Plan: - Heparin sc.          ALVAREZ Hunt-NP   Sreedhar Cordon DO [facility]  Cardiovascular Medicine  [address] [facility], [address]  Available through call or text on [employer] TE[employer]  Office: [phone]

## 2024-02-29 NOTE — PROGRESS NOTE ADULT - SUBJECTIVE AND OBJECTIVE BOX
[facility] Division of Hospital Medicine  Gabrielle Schmidt MD  Available via MS Teams      SUBJECTIVE / OVERNIGHT EVENTS: No acute events overnight. No chest pain, palpitations, headache, or right wrist pain.    ADDITIONAL REVIEW OF SYSTEMS:     MEDICATIONS  (STANDING):  aspirin  chewable 81 milliGRAM(s) Oral daily  atorvastatin 40 milliGRAM(s) Oral at bedtime  carvedilol 25 milliGRAM(s) Oral every 12 hours  chlorhexidine 2% Cloths 1 Application(s) Topical <User Schedule>  clopidogrel Tablet 75 milliGRAM(s) Oral daily  dextrose 5%. 1000 milliLiter(s) (100 mL/Hr) IV Continuous <Continuous>  dextrose 5%. 1000 milliLiter(s) (50 mL/Hr) IV Continuous <Continuous>  dextrose 50% Injectable 25 Gram(s) IV Push once  dextrose 50% Injectable 25 Gram(s) IV Push once  dextrose 50% Injectable 12.5 Gram(s) IV Push once  finasteride 5 milliGRAM(s) Oral daily  glucagon  Injectable 1 milliGRAM(s) IntraMuscular once  insulin lispro (ADMELOG) corrective regimen sliding scale   SubCutaneous at bedtime  insulin lispro (ADMELOG) corrective regimen sliding scale   SubCutaneous three times a day before meals  isosorbide   mononitrate ER Tablet (IMDUR) 60 milliGRAM(s) Oral daily  lidocaine   4% Patch 1 Patch Transdermal daily  losartan 100 milliGRAM(s) Oral daily  melatonin 1 milliGRAM(s) Oral at bedtime  sodium chloride 0.9%. 1000 milliLiter(s) (75 mL/Hr) IV Continuous <Continuous>  sodium chloride 0.9%. 1000 milliLiter(s) (75 mL/Hr) IV Continuous <Continuous>    MEDICATIONS  (PRN):  acetaminophen     Tablet .. 650 milliGRAM(s) Oral every 6 hours PRN Temp greater or equal to 38C (100.4F), Mild Pain (1 - 3)  dextrose Oral Gel 15 Gram(s) Oral once PRN Blood Glucose LESS THAN 70 milliGRAM(s)/deciliter  polyethylene glycol 3350 17 Gram(s) Oral daily PRN for constipation      I&O's Summary    28 Feb 2024 07:01  -  29 Feb 2024 07:00  --------------------------------------------------------  IN: 240 mL / OUT: 1500 mL / NET: -1260 mL        PHYSICAL EXAM:  Vital Signs Last 24 Hrs  T(C): 36.7 (29 Feb 2024 11:43), Max: 36.7 (28 Feb 2024 21:32)  T(F): 98.1 (29 Feb 2024 11:43), Max: 98.1 (28 Feb 2024 21:32)  HR: 60 (29 Feb 2024 11:43) (56 - 72)  BP: 101/61 (29 Feb 2024 11:43) (101/61 - 194/86)  BP(mean): --  RR: 18 (29 Feb 2024 11:43) (16 - 18)  SpO2: 97% (29 Feb 2024 11:43) (94% - 99%)    Parameters below as of 29 Feb 2024 11:43  Patient On (Oxygen Delivery Method): room air      CONSTITUTIONAL: NAD, well-developed, sitting up in chair  NECK: Supple  RESPIRATORY: Normal respiratory effort; lungs are clear to auscultation bilaterally  CARDIOVASCULAR: Regular rate and rhythm, normal S1 and S2  ABDOMEN: Nontender to palpation  MUSCULOSKELETAL:  Right wrist small area of ecchymosis at cath entry site, non bleeding  PSYCH: A+O to person, place, and time; affect appropriate  SKIN: No rashes    LABS:                        11.6   9.30  )-----------( 272      ( 29 Feb 2024 07:37 )             34.7     02-29    139  |  106  |  35<H>  ----------------------------<  108<H>  4.4   |  21<L>  |  0.89    Ca    9.3      29 Feb 2024 07:38  Phos  3.0     02-29  Mg     1.8     02-29

## 2024-03-01 LAB
GLUCOSE BLDC GLUCOMTR-MCNC: 105 MG/DL — HIGH (ref 70–99)
GLUCOSE BLDC GLUCOMTR-MCNC: 136 MG/DL — HIGH (ref 70–99)
GLUCOSE BLDC GLUCOMTR-MCNC: 165 MG/DL — HIGH (ref 70–99)
GLUCOSE BLDC GLUCOMTR-MCNC: 167 MG/DL — HIGH (ref 70–99)
SARS-COV-2 RNA SPEC QL NAA+PROBE: SIGNIFICANT CHANGE UP

## 2024-03-01 RX ADMIN — ATORVASTATIN CALCIUM 40 MILLIGRAM(S): 80 TABLET, FILM COATED ORAL at 22:18

## 2024-03-01 RX ADMIN — CLOPIDOGREL BISULFATE 75 MILLIGRAM(S): 75 TABLET, FILM COATED ORAL at 12:05

## 2024-03-01 RX ADMIN — LOSARTAN POTASSIUM 100 MILLIGRAM(S): 100 TABLET, FILM COATED ORAL at 08:35

## 2024-03-01 RX ADMIN — LIDOCAINE 1 PATCH: 4 CREAM TOPICAL at 21:33

## 2024-03-01 RX ADMIN — LIDOCAINE 1 PATCH: 4 CREAM TOPICAL at 03:44

## 2024-03-01 RX ADMIN — Medication 81 MILLIGRAM(S): at 12:05

## 2024-03-01 RX ADMIN — ISOSORBIDE MONONITRATE 60 MILLIGRAM(S): 60 TABLET, EXTENDED RELEASE ORAL at 12:05

## 2024-03-01 RX ADMIN — Medication 650 MILLIGRAM(S): at 10:00

## 2024-03-01 RX ADMIN — Medication 1: at 12:58

## 2024-03-01 RX ADMIN — Medication 1: at 16:51

## 2024-03-01 RX ADMIN — Medication 650 MILLIGRAM(S): at 17:23

## 2024-03-01 RX ADMIN — LIDOCAINE 1 PATCH: 4 CREAM TOPICAL at 12:05

## 2024-03-01 RX ADMIN — Medication 650 MILLIGRAM(S): at 16:50

## 2024-03-01 RX ADMIN — CHLORHEXIDINE GLUCONATE 1 APPLICATION(S): 213 SOLUTION TOPICAL at 05:31

## 2024-03-01 RX ADMIN — Medication 0: at 22:19

## 2024-03-01 RX ADMIN — FINASTERIDE 5 MILLIGRAM(S): 5 TABLET, FILM COATED ORAL at 12:06

## 2024-03-01 RX ADMIN — Medication 650 MILLIGRAM(S): at 01:15

## 2024-03-01 RX ADMIN — Medication 1 MILLIGRAM(S): at 22:57

## 2024-03-01 RX ADMIN — Medication 650 MILLIGRAM(S): at 08:35

## 2024-03-01 RX ADMIN — Medication 650 MILLIGRAM(S): at 00:27

## 2024-03-01 NOTE — PROGRESS NOTE ADULT - SUBJECTIVE AND OBJECTIVE BOX
[facility] Division of Hospital Medicine  Gabrielle Schmidt MD  Available via MS Teams      SUBJECTIVE / OVERNIGHT EVENTS: No acute events overnight. Pt notes that he has pain with movement of left shoulder. No chest pain or palpitations    ADDITIONAL REVIEW OF SYSTEMS:    MEDICATIONS  (STANDING):  aspirin  chewable 81 milliGRAM(s) Oral daily  atorvastatin 40 milliGRAM(s) Oral at bedtime  carvedilol 25 milliGRAM(s) Oral every 12 hours  chlorhexidine 2% Cloths 1 Application(s) Topical <User Schedule>  clopidogrel Tablet 75 milliGRAM(s) Oral daily  dextrose 5%. 1000 milliLiter(s) (100 mL/Hr) IV Continuous <Continuous>  dextrose 5%. 1000 milliLiter(s) (50 mL/Hr) IV Continuous <Continuous>  dextrose 50% Injectable 12.5 Gram(s) IV Push once  dextrose 50% Injectable 25 Gram(s) IV Push once  dextrose 50% Injectable 25 Gram(s) IV Push once  finasteride 5 milliGRAM(s) Oral daily  glucagon  Injectable 1 milliGRAM(s) IntraMuscular once  insulin lispro (ADMELOG) corrective regimen sliding scale   SubCutaneous three times a day before meals  insulin lispro (ADMELOG) corrective regimen sliding scale   SubCutaneous at bedtime  isosorbide   mononitrate ER Tablet (IMDUR) 60 milliGRAM(s) Oral daily  lidocaine   4% Patch 1 Patch Transdermal daily  losartan 100 milliGRAM(s) Oral daily  melatonin 1 milliGRAM(s) Oral at bedtime  sodium chloride 0.9%. 1000 milliLiter(s) (75 mL/Hr) IV Continuous <Continuous>  sodium chloride 0.9%. 1000 milliLiter(s) (75 mL/Hr) IV Continuous <Continuous>    MEDICATIONS  (PRN):  acetaminophen     Tablet .. 650 milliGRAM(s) Oral every 6 hours PRN Temp greater or equal to 38C (100.4F), Mild Pain (1 - 3)  dextrose Oral Gel 15 Gram(s) Oral once PRN Blood Glucose LESS THAN 70 milliGRAM(s)/deciliter  polyethylene glycol 3350 17 Gram(s) Oral daily PRN for constipation      I&O's Summary    29 Feb 2024 07:01  -  01 Mar 2024 07:00  --------------------------------------------------------  IN: 100 mL / OUT: 1100 mL / NET: -1000 mL    01 Mar 2024 07:01  -  01 Mar 2024 16:12  --------------------------------------------------------  IN: 0 mL / OUT: 1650 mL / NET: -1650 mL        PHYSICAL EXAM:  Vital Signs Last 24 Hrs  T(C): 36.3 (01 Mar 2024 11:50), Max: 36.7 (01 Mar 2024 04:04)  T(F): 97.4 (01 Mar 2024 11:50), Max: 98.1 (01 Mar 2024 04:04)  HR: 53 (01 Mar 2024 11:50) (53 - 65)  BP: 121/53 (01 Mar 2024 11:50) (106/70 - 133/73)  BP(mean): --  RR: 18 (01 Mar 2024 11:50) (17 - 18)  SpO2: 97% (01 Mar 2024 11:50) (97% - 99%)    Parameters below as of 01 Mar 2024 11:50  Patient On (Oxygen Delivery Method): room air      CONSTITUTIONAL: NAD, well-developed, sitting up in chair  NECK: Supple  RESPIRATORY: Normal respiratory effort; lungs are clear to auscultation bilaterally  CARDIOVASCULAR: Regular rate and rhythm, normal S1 and S2  ABDOMEN: Nontender to palpation  MUSCULOSKELETAL:  Right wrist small area of ecchymosis at cath entry site, non bleeding  PSYCH: A+O to person, place, and time; affect appropriate  SKIN: No rashes      LABS:                        11.6   9.30  )-----------( 272      ( 29 Feb 2024 07:37 )             34.7     02-29    139  |  106  |  35<H>  ----------------------------<  108<H>  4.4   |  21<L>  |  0.89    Ca    9.3      29 Feb 2024 07:38  Phos  3.0     02-29  Mg     1.8     02-29

## 2024-03-01 NOTE — PROGRESS NOTE ADULT - NSPROGADDITIONALINFOA_GEN_ALL_CORE
Plan for ANA discussed with pt at bedside.  D/w ACP and cardiology team to maintain meds Plan for ANA discussed with pt at bedside.  D/w ACP and cardiology team to continue with CAD meds

## 2024-03-01 NOTE — PROGRESS NOTE ADULT - SUBJECTIVE AND OBJECTIVE BOX
DATE OF SERVICE: 03-01-24 @ 15:48    Patient is a 78y old  Male who presents with a chief complaint of chest pain (29 Feb 2024 13:39)      INTERVAL HISTORY: Feels ok.     REVIEW OF SYSTEMS:  CONSTITUTIONAL: No weakness  EYES/ENT: No visual changes;  No throat pain   NECK: No pain or stiffness  RESPIRATORY: No cough, wheezing; No shortness of breath  CARDIOVASCULAR: No chest pain or palpitations  GASTROINTESTINAL: No abdominal  pain. No nausea, vomiting, or hematemesis  GENITOURINARY: No dysuria, frequency or hematuria  NEUROLOGICAL: No stroke like symptoms  SKIN: No rashes    TELEMETRY Personally reviewed: SB/SR 50-60 with 4 sec PAT  	  MEDICATIONS:  carvedilol 25 milliGRAM(s) Oral every 12 hours  isosorbide   mononitrate ER Tablet (IMDUR) 60 milliGRAM(s) Oral daily  losartan 100 milliGRAM(s) Oral daily        PHYSICAL EXAM:  T(C): 36.3 (03-01-24 @ 11:50), Max: 36.7 (03-01-24 @ 04:04)  HR: 53 (03-01-24 @ 11:50) (53 - 65)  BP: 121/53 (03-01-24 @ 11:50) (106/70 - 133/73)  RR: 18 (03-01-24 @ 11:50) (17 - 18)  SpO2: 97% (03-01-24 @ 11:50) (97% - 99%)  Wt(kg): --  I&O's Summary    29 Feb 2024 07:01  -  01 Mar 2024 07:00  --------------------------------------------------------  IN: 100 mL / OUT: 1100 mL / NET: -1000 mL    01 Mar 2024 07:01  -  01 Mar 2024 15:48  --------------------------------------------------------  IN: 0 mL / OUT: 1650 mL / NET: -1650 mL          Appearance: In no distress	  HEENT:    PERRL, EOMI	  Cardiovascular:  S1 S2, No JVD  Respiratory: Lungs clear to auscultation	  Gastrointestinal:  Soft, Non-tender, + BS	  Vascularature:  No edema of LE  Psychiatric: Appropriate affect   Neuro: no acute focal deficits                               11.6   9.30  )-----------( 272      ( 29 Feb 2024 07:37 )             34.7     02-29    139  |  106  |  35<H>  ----------------------------<  108<H>  4.4   |  21<L>  |  0.89    Ca    9.3      29 Feb 2024 07:38  Phos  3.0     02-29  Mg     1.8     02-29          Labs personally reviewed      ASSESSMENT/PLAN: 	  78-year-old male history of  CAD s/p PCI (last 2021 to OM1), HTN, HLD, DM2, BPH presents with chest pain at rest from rehab admitted for ischemic evaluation        Problem/Plan - 1:  ·  Problem: Atypical chest pain.   ·  Plan: sharp chest pain at rest and reproducible, no exertional chest pain with PT at rehab, EKG unchanged and Troponin 31 and 28  - TTE limited windows but unremarkable  - NM stress test with large area of ischemia  - s/p cardiac cath 2/28 with PCI to mLCx  - c/w ASA 81mg PO, Plavix 75mg PO and Atorvastatin 40mg PO daily    Problem/Plan - 2:  ·  Problem: CAD S/P percutaneous coronary angioplasty.   ·  Plan: - will continue ASA, Plavix, Coreg, Imdur, and statin.    Problem/Plan - 3:  ·  Problem: Diabetes mellitus.   ·  Plan: NISS  - A1C 6.4    Problem/Plan - 4:  ·  Problem: HTN   ·  Plan: Will continue Coreg, Imdur and Losartan    Problem/Plan - 5:  ·  Problem: Prophylactic measure.   ·  Plan: - Heparin sc.            ALVAREZ Hunt-NP   Sreedhar Cordon DO [facility]  Cardiovascular Medicine  [address], [address]  Available through call or text on [employer] TE[employer]  Office: [phone]   DATE OF SERVICE: 03-01-24 @ 15:48    Patient is a 78y old  Male who presents with a chief complaint of chest pain (29 Feb 2024 13:39)      INTERVAL HISTORY: Feels ok.     REVIEW OF SYSTEMS:  CONSTITUTIONAL: No weakness  EYES/ENT: No visual changes;  No throat pain   NECK: No pain or stiffness  RESPIRATORY: No cough, wheezing; No shortness of breath  CARDIOVASCULAR: No chest pain or palpitations  GASTROINTESTINAL: No abdominal  pain. No nausea, vomiting, or hematemesis  GENITOURINARY: No dysuria, frequency or hematuria  NEUROLOGICAL: No stroke like symptoms  SKIN: No rashes    TELEMETRY Personally reviewed: SB/SR 50-60 with 4 sec PAT  	  MEDICATIONS:  carvedilol 25 milliGRAM(s) Oral every 12 hours  isosorbide   mononitrate ER Tablet (IMDUR) 60 milliGRAM(s) Oral daily  losartan 100 milliGRAM(s) Oral daily        PHYSICAL EXAM:  T(C): 36.3 (03-01-24 @ 11:50), Max: 36.7 (03-01-24 @ 04:04)  HR: 53 (03-01-24 @ 11:50) (53 - 65)  BP: 121/53 (03-01-24 @ 11:50) (106/70 - 133/73)  RR: 18 (03-01-24 @ 11:50) (17 - 18)  SpO2: 97% (03-01-24 @ 11:50) (97% - 99%)  Wt(kg): --  I&O's Summary    29 Feb 2024 07:01  -  01 Mar 2024 07:00  --------------------------------------------------------  IN: 100 mL / OUT: 1100 mL / NET: -1000 mL    01 Mar 2024 07:01  -  01 Mar 2024 15:48  --------------------------------------------------------  IN: 0 mL / OUT: 1650 mL / NET: -1650 mL          Appearance: In no distress	  HEENT:    PERRL, EOMI	  Cardiovascular:  S1 S2, No JVD  Respiratory: Lungs clear to auscultation	  Gastrointestinal:  Soft, Non-tender, + BS	  Vascularature:  No edema of LE  Psychiatric: Appropriate affect   Neuro: no acute focal deficits                               11.6   9.30  )-----------( 272      ( 29 Feb 2024 07:37 )             34.7     02-29    139  |  106  |  35<H>  ----------------------------<  108<H>  4.4   |  21<L>  |  0.89    Ca    9.3      29 Feb 2024 07:38  Phos  3.0     02-29  Mg     1.8     02-29          Labs personally reviewed      ASSESSMENT/PLAN: 	  78-year-old male history of  CAD s/p PCI (last 2021 to OM1), HTN, HLD, DM2, BPH presents with chest pain at rest from rehab admitted for ischemic evaluation        Problem/Plan - 1:  ·  Problem: Atypical chest pain.   ·  Plan: sharp chest pain at rest and reproducible, no exertional chest pain with PT at rehab, EKG unchanged and Troponin 31 and 28  - TTE limited windows but unremarkable  - NM stress test with large area of ischemia  - s/p cardiac cath 2/28 with PCI to mLCx  - c/w ASA 81mg PO, Plavix 75mg PO and Atorvastatin 40mg PO daily    Problem/Plan - 2:  ·  Problem: CAD S/P percutaneous coronary angioplasty.   ·  Plan: - will continue ASA, Plavix, Coreg, Imdur, and statin.    Problem/Plan - 3:  ·  Problem: Diabetes mellitus.   ·  Plan: NISS  - A1C 6.4    Problem/Plan - 4:  ·  Problem: HTN   ·  Plan: Will continue Coreg, Imdur and Losartan    Problem/Plan - 5:  ·  Problem: Prophylactic measure.   ·  Plan: - Heparin sc.            ALVAREZ Hunt-NP   Sreedhar Cordon DO [facility]  Cardiovascular Medicine  [address], [address]  Available through call or text on [employer] TEAMs  Office: [phone]

## 2024-03-02 LAB
ANION GAP SERPL CALC-SCNC: 12 MMOL/L — SIGNIFICANT CHANGE UP (ref 5–17)
BUN SERPL-MCNC: 26 MG/DL — HIGH (ref 7–23)
CALCIUM SERPL-MCNC: 9.6 MG/DL — SIGNIFICANT CHANGE UP (ref 8.4–10.5)
CHLORIDE SERPL-SCNC: 109 MMOL/L — HIGH (ref 96–108)
CO2 SERPL-SCNC: 21 MMOL/L — LOW (ref 22–31)
CREAT SERPL-MCNC: 0.78 MG/DL — SIGNIFICANT CHANGE UP (ref 0.5–1.3)
EGFR: 90 ML/MIN/1.73M2 — SIGNIFICANT CHANGE UP
GLUCOSE BLDC GLUCOMTR-MCNC: 107 MG/DL — HIGH (ref 70–99)
GLUCOSE BLDC GLUCOMTR-MCNC: 122 MG/DL — HIGH (ref 70–99)
GLUCOSE BLDC GLUCOMTR-MCNC: 123 MG/DL — HIGH (ref 70–99)
GLUCOSE BLDC GLUCOMTR-MCNC: 201 MG/DL — HIGH (ref 70–99)
GLUCOSE SERPL-MCNC: 102 MG/DL — HIGH (ref 70–99)
HCT VFR BLD CALC: 35.1 % — LOW (ref 39–50)
HGB BLD-MCNC: 12 G/DL — LOW (ref 13–17)
MAGNESIUM SERPL-MCNC: 1.7 MG/DL — SIGNIFICANT CHANGE UP (ref 1.6–2.6)
MCHC RBC-ENTMCNC: 31 PG — SIGNIFICANT CHANGE UP (ref 27–34)
MCHC RBC-ENTMCNC: 34.2 GM/DL — SIGNIFICANT CHANGE UP (ref 32–36)
MCV RBC AUTO: 90.7 FL — SIGNIFICANT CHANGE UP (ref 80–100)
NRBC # BLD: 0 /100 WBCS — SIGNIFICANT CHANGE UP (ref 0–0)
PHOSPHATE SERPL-MCNC: 2.8 MG/DL — SIGNIFICANT CHANGE UP (ref 2.5–4.5)
PLATELET # BLD AUTO: 334 K/UL — SIGNIFICANT CHANGE UP (ref 150–400)
POTASSIUM SERPL-MCNC: 4.1 MMOL/L — SIGNIFICANT CHANGE UP (ref 3.5–5.3)
POTASSIUM SERPL-SCNC: 4.1 MMOL/L — SIGNIFICANT CHANGE UP (ref 3.5–5.3)
RBC # BLD: 3.87 M/UL — LOW (ref 4.2–5.8)
RBC # FLD: 11.9 % — SIGNIFICANT CHANGE UP (ref 10.3–14.5)
SODIUM SERPL-SCNC: 142 MMOL/L — SIGNIFICANT CHANGE UP (ref 135–145)
WBC # BLD: 7.49 K/UL — SIGNIFICANT CHANGE UP (ref 3.8–10.5)
WBC # FLD AUTO: 7.49 K/UL — SIGNIFICANT CHANGE UP (ref 3.8–10.5)

## 2024-03-02 RX ORDER — ACETAMINOPHEN 500 MG
650 TABLET ORAL ONCE
Refills: 0 | Status: COMPLETED | OUTPATIENT
Start: 2024-03-02 | End: 2024-03-02

## 2024-03-02 RX ADMIN — Medication 650 MILLIGRAM(S): at 21:21

## 2024-03-02 RX ADMIN — CARVEDILOL PHOSPHATE 25 MILLIGRAM(S): 80 CAPSULE, EXTENDED RELEASE ORAL at 06:45

## 2024-03-02 RX ADMIN — LIDOCAINE 1 PATCH: 4 CREAM TOPICAL at 18:09

## 2024-03-02 RX ADMIN — Medication 650 MILLIGRAM(S): at 17:15

## 2024-03-02 RX ADMIN — Medication 650 MILLIGRAM(S): at 21:09

## 2024-03-02 RX ADMIN — Medication 1 MILLIGRAM(S): at 21:21

## 2024-03-02 RX ADMIN — FINASTERIDE 5 MILLIGRAM(S): 5 TABLET, FILM COATED ORAL at 11:25

## 2024-03-02 RX ADMIN — Medication 81 MILLIGRAM(S): at 11:25

## 2024-03-02 RX ADMIN — CLOPIDOGREL BISULFATE 75 MILLIGRAM(S): 75 TABLET, FILM COATED ORAL at 11:25

## 2024-03-02 RX ADMIN — CHLORHEXIDINE GLUCONATE 1 APPLICATION(S): 213 SOLUTION TOPICAL at 07:41

## 2024-03-02 RX ADMIN — LIDOCAINE 1 PATCH: 4 CREAM TOPICAL at 11:24

## 2024-03-02 RX ADMIN — LIDOCAINE 1 PATCH: 4 CREAM TOPICAL at 00:57

## 2024-03-02 RX ADMIN — Medication 2: at 11:43

## 2024-03-02 RX ADMIN — CARVEDILOL PHOSPHATE 25 MILLIGRAM(S): 80 CAPSULE, EXTENDED RELEASE ORAL at 17:08

## 2024-03-02 RX ADMIN — LIDOCAINE 1 PATCH: 4 CREAM TOPICAL at 21:09

## 2024-03-02 RX ADMIN — ISOSORBIDE MONONITRATE 60 MILLIGRAM(S): 60 TABLET, EXTENDED RELEASE ORAL at 11:25

## 2024-03-02 RX ADMIN — Medication 650 MILLIGRAM(S): at 16:04

## 2024-03-02 RX ADMIN — LOSARTAN POTASSIUM 100 MILLIGRAM(S): 100 TABLET, FILM COATED ORAL at 06:46

## 2024-03-02 RX ADMIN — ATORVASTATIN CALCIUM 40 MILLIGRAM(S): 80 TABLET, FILM COATED ORAL at 21:21

## 2024-03-02 NOTE — PROGRESS NOTE ADULT - SUBJECTIVE AND OBJECTIVE BOX
[facility] Division of Hospital Medicine  Gabrielle Schmidt MD  Available via MS Teams      SUBJECTIVE / OVERNIGHT EVENTS: No acute events overnight. No chest pain palpitations or shortness of breath    ADDITIONAL REVIEW OF SYSTEMS: ROS reviewed and found to be negative    MEDICATIONS  (STANDING):  aspirin  chewable 81 milliGRAM(s) Oral daily  atorvastatin 40 milliGRAM(s) Oral at bedtime  carvedilol 25 milliGRAM(s) Oral every 12 hours  chlorhexidine 2% Cloths 1 Application(s) Topical <User Schedule>  clopidogrel Tablet 75 milliGRAM(s) Oral daily  dextrose 5%. 1000 milliLiter(s) (50 mL/Hr) IV Continuous <Continuous>  dextrose 5%. 1000 milliLiter(s) (100 mL/Hr) IV Continuous <Continuous>  dextrose 50% Injectable 25 Gram(s) IV Push once  dextrose 50% Injectable 25 Gram(s) IV Push once  dextrose 50% Injectable 12.5 Gram(s) IV Push once  finasteride 5 milliGRAM(s) Oral daily  glucagon  Injectable 1 milliGRAM(s) IntraMuscular once  insulin lispro (ADMELOG) corrective regimen sliding scale   SubCutaneous three times a day before meals  insulin lispro (ADMELOG) corrective regimen sliding scale   SubCutaneous at bedtime  isosorbide   mononitrate ER Tablet (IMDUR) 60 milliGRAM(s) Oral daily  lidocaine   4% Patch 1 Patch Transdermal daily  losartan 100 milliGRAM(s) Oral daily  melatonin 1 milliGRAM(s) Oral at bedtime  sodium chloride 0.9%. 1000 milliLiter(s) (75 mL/Hr) IV Continuous <Continuous>  sodium chloride 0.9%. 1000 milliLiter(s) (75 mL/Hr) IV Continuous <Continuous>    MEDICATIONS  (PRN):  acetaminophen     Tablet .. 650 milliGRAM(s) Oral every 6 hours PRN Temp greater or equal to 38C (100.4F), Mild Pain (1 - 3)  dextrose Oral Gel 15 Gram(s) Oral once PRN Blood Glucose LESS THAN 70 milliGRAM(s)/deciliter  polyethylene glycol 3350 17 Gram(s) Oral daily PRN for constipation      I&O's Summary    01 Mar 2024 07:01  -  02 Mar 2024 07:00  --------------------------------------------------------  IN: 0 mL / OUT: 2600 mL / NET: -2600 mL    02 Mar 2024 07:01  -  02 Mar 2024 15:43  --------------------------------------------------------  IN: 0 mL / OUT: 1800 mL / NET: -1800 mL        PHYSICAL EXAM:  Vital Signs Last 24 Hrs  T(C): 36.6 (02 Mar 2024 11:54), Max: 37.1 (01 Mar 2024 20:43)  T(F): 97.8 (02 Mar 2024 11:54), Max: 98.8 (01 Mar 2024 20:43)  HR: 55 (02 Mar 2024 11:54) (54 - 61)  BP: 159/67 (02 Mar 2024 11:54) (101/56 - 159/67)  BP(mean): --  RR: 18 (02 Mar 2024 11:54) (18 - 19)  SpO2: 96% (02 Mar 2024 11:54) (96% - 99%)    Parameters below as of 02 Mar 2024 11:54  Patient On (Oxygen Delivery Method): room air      CONSTITUTIONAL: NAD, well-developed, sitting up in chair  NECK: Supple  RESPIRATORY: Normal respiratory effort; lungs are clear to auscultation bilaterally  CARDIOVASCULAR: Regular rate and rhythm, normal S1 and S2  ABDOMEN: Nontender to palpation  MUSCULOSKELETAL:  Right wrist small area of ecchymosis at cath entry site, non bleeding  PSYCH: A+O to person, place, and time; affect appropriate  SKIN: No rashes      LABS:                        12.0   7.49  )-----------( 334      ( 02 Mar 2024 07:24 )             35.1     03-02    142  |  109<H>  |  26<H>  ----------------------------<  102<H>  4.1   |  21<L>  |  0.78    Ca    9.6      02 Mar 2024 07:24  Phos  2.8     03-02  Mg     1.7     03-02

## 2024-03-02 NOTE — PROGRESS NOTE ADULT - SUBJECTIVE AND OBJECTIVE BOX
DATE OF SERVICE: 03-02-24 @ 12:41    Patient is a 78y old  Male who presents with a chief complaint of chest pain (01 Mar 2024 16:12)      INTERVAL HISTORY:   doing well , denies all cp, sob or palpitations     REVIEW OF SYSTEMS:  CONSTITUTIONAL: No weakness  EYES/ENT: No visual changes;  No throat pain   NECK: No pain or stiffness  RESPIRATORY: No cough, wheezing; No shortness of breath  CARDIOVASCULAR: No chest pain or palpitations  GASTROINTESTINAL: No abdominal  pain. No nausea, vomiting, or hematemesis  GENITOURINARY: No dysuria, frequency or hematuria  NEUROLOGICAL: No stroke like symptoms  SKIN: No rashes    TELEMETRY Personally reviewed: SB-SR 50-70 bpm   	  MEDICATIONS:  carvedilol 25 milliGRAM(s) Oral every 12 hours  isosorbide   mononitrate ER Tablet (IMDUR) 60 milliGRAM(s) Oral daily  losartan 100 milliGRAM(s) Oral daily        PHYSICAL EXAM:  T(C): 36.6 (03-02-24 @ 11:54), Max: 37.1 (03-01-24 @ 20:43)  HR: 55 (03-02-24 @ 11:54) (54 - 61)  BP: 159/67 (03-02-24 @ 11:54) (101/56 - 159/67)  RR: 18 (03-02-24 @ 11:54) (18 - 19)  SpO2: 96% (03-02-24 @ 11:54) (96% - 99%)  Wt(kg): --  I&O's Summary    01 Mar 2024 07:01  -  02 Mar 2024 07:00  --------------------------------------------------------  IN: 0 mL / OUT: 2600 mL / NET: -2600 mL          Appearance: In no distress	  HEENT:    PERRL, EOMI	  Cardiovascular:  S1 S2, No JVD  Respiratory: Lungs clear to auscultation	  Gastrointestinal:  Soft, Non-tender, + BS	  Vascularature:  No edema of LE  Psychiatric: Appropriate affect   Neuro: no acute focal deficits                               12.0   7.49  )-----------( 334      ( 02 Mar 2024 07:24 )             35.1     03-02    142  |  109<H>  |  26<H>  ----------------------------<  102<H>  4.1   |  21<L>  |  0.78    Ca    9.6      02 Mar 2024 07:24  Phos  2.8     03-02  Mg     1.7     03-02          Labs personally reviewed      ASSESSMENT/PLAN: 	  78-year-old male history of  CAD s/p PCI (last 2021 to OM1), HTN, HLD, DM2, BPH presents with chest pain at rest from rehab admitted for ischemic evaluation        Problem/Plan - 1:  ·  Problem: Atypical chest pain.   ·  Plan: sharp chest pain at rest and reproducible, no exertional chest pain with PT at rehab, EKG unchanged and Troponin 31 and 28  - TTE limited windows but unremarkable  - NM stress test with large area of ischemia  - s/p cardiac cath 2/28 with PCI to mLCx  - c/w ASA 81mg PO, Plavix 75mg PO and Atorvastatin 40mg PO daily    Problem/Plan - 2:  ·  Problem: CAD S/P percutaneous coronary angioplasty.   ·  Plan: - will continue ASA, Plavix, Coreg, Imdur, and statin.    Problem/Plan - 3:  ·  Problem: Diabetes mellitus.   ·  Plan: NISS  - A1C 6.4    Problem/Plan - 4:  ·  Problem: HTN   ·  Plan: Will continue Coreg, Imdur and Losartan    Problem/Plan - 5:  ·  Problem: Prophylactic measure.   ·  Plan: - Heparin sc.          BERNARDO Tomlinson DO [facility]  Cardiovascular Medicine  [address] [place] [address], [address]  Available through call or text on [employer] TE[employer]  Office: [phone]   DATE OF SERVICE: 03-02-24 @ 12:41    Patient is a 78y old  Male who presents with a chief complaint of chest pain (01 Mar 2024 16:12)      INTERVAL HISTORY:   doing well , denies all cp, sob or palpitations     REVIEW OF SYSTEMS:  CONSTITUTIONAL: No weakness  EYES/ENT: No visual changes;  No throat pain   NECK: No pain or stiffness  RESPIRATORY: No cough, wheezing; No shortness of breath  CARDIOVASCULAR: No chest pain or palpitations  GASTROINTESTINAL: No abdominal  pain. No nausea, vomiting, or hematemesis  GENITOURINARY: No dysuria, frequency or hematuria  NEUROLOGICAL: No stroke like symptoms  SKIN: No rashes    TELEMETRY Personally reviewed: SB-SR 50-70 bpm   	  MEDICATIONS:  carvedilol 25 milliGRAM(s) Oral every 12 hours  isosorbide   mononitrate ER Tablet (IMDUR) 60 milliGRAM(s) Oral daily  losartan 100 milliGRAM(s) Oral daily        PHYSICAL EXAM:  T(C): 36.6 (03-02-24 @ 11:54), Max: 37.1 (03-01-24 @ 20:43)  HR: 55 (03-02-24 @ 11:54) (54 - 61)  BP: 159/67 (03-02-24 @ 11:54) (101/56 - 159/67)  RR: 18 (03-02-24 @ 11:54) (18 - 19)  SpO2: 96% (03-02-24 @ 11:54) (96% - 99%)  Wt(kg): --  I&O's Summary    01 Mar 2024 07:01  -  02 Mar 2024 07:00  --------------------------------------------------------  IN: 0 mL / OUT: 2600 mL / NET: -2600 mL          Appearance: In no distress	  HEENT:    PERRL, EOMI	  Cardiovascular:  S1 S2, No JVD  Respiratory: Lungs clear to auscultation	  Gastrointestinal:  Soft, Non-tender, + BS	  Vascularature:  No edema of LE  Psychiatric: Appropriate affect   Neuro: no acute focal deficits                               12.0   7.49  )-----------( 334      ( 02 Mar 2024 07:24 )             35.1     03-02    142  |  109<H>  |  26<H>  ----------------------------<  102<H>  4.1   |  21<L>  |  0.78    Ca    9.6      02 Mar 2024 07:24  Phos  2.8     03-02  Mg     1.7     03-02          Labs personally reviewed      ASSESSMENT/PLAN: 	  78-year-old male history of  CAD s/p PCI (last 2021 to OM1), HTN, HLD, DM2, BPH presents with chest pain at rest from rehab admitted for ischemic evaluation        Problem/Plan - 1:  ·  Problem: Atypical chest pain.   ·  Plan: sharp chest pain at rest and reproducible, no exertional chest pain with PT at rehab, EKG unchanged and Troponin 31 and 28  - TTE limited windows but unremarkable  - NM stress test with large area of ischemia  - s/p cardiac cath 2/28 with PCI to mLCx  - c/w ASA 81mg PO, Plavix 75mg PO and Atorvastatin 40mg PO daily    Problem/Plan - 2:  ·  Problem: CAD S/P percutaneous coronary angioplasty.   ·  Plan: - will continue ASA, Plavix, Coreg, Imdur, and statin.    Problem/Plan - 3:  ·  Problem: Diabetes mellitus.   ·  Plan: NISS  - A1C 6.4    Problem/Plan - 4:  ·  Problem: HTN   ·  Plan: Will continue Coreg, Imdur and Losartan    Problem/Plan - 5:  ·  Problem: Prophylactic measure.   ·  Plan: - Heparin sc.          BERNARDO Tomlinson DO [facility]  Cardiovascular Medicine  [address] [place] [address], [address]  Available through call or text on [employer] TE[employer]  Office: [phone]

## 2024-03-03 LAB
GLUCOSE BLDC GLUCOMTR-MCNC: 103 MG/DL — HIGH (ref 70–99)
GLUCOSE BLDC GLUCOMTR-MCNC: 151 MG/DL — HIGH (ref 70–99)
GLUCOSE BLDC GLUCOMTR-MCNC: 156 MG/DL — HIGH (ref 70–99)
GLUCOSE BLDC GLUCOMTR-MCNC: 161 MG/DL — HIGH (ref 70–99)

## 2024-03-03 RX ADMIN — Medication 650 MILLIGRAM(S): at 19:32

## 2024-03-03 RX ADMIN — ATORVASTATIN CALCIUM 40 MILLIGRAM(S): 80 TABLET, FILM COATED ORAL at 21:32

## 2024-03-03 RX ADMIN — LOSARTAN POTASSIUM 100 MILLIGRAM(S): 100 TABLET, FILM COATED ORAL at 05:58

## 2024-03-03 RX ADMIN — Medication 650 MILLIGRAM(S): at 11:35

## 2024-03-03 RX ADMIN — LIDOCAINE 1 PATCH: 4 CREAM TOPICAL at 11:35

## 2024-03-03 RX ADMIN — ISOSORBIDE MONONITRATE 60 MILLIGRAM(S): 60 TABLET, EXTENDED RELEASE ORAL at 11:35

## 2024-03-03 RX ADMIN — Medication 81 MILLIGRAM(S): at 11:34

## 2024-03-03 RX ADMIN — CHLORHEXIDINE GLUCONATE 1 APPLICATION(S): 213 SOLUTION TOPICAL at 05:58

## 2024-03-03 RX ADMIN — Medication 650 MILLIGRAM(S): at 20:00

## 2024-03-03 RX ADMIN — LIDOCAINE 1 PATCH: 4 CREAM TOPICAL at 23:41

## 2024-03-03 RX ADMIN — Medication 1 MILLIGRAM(S): at 21:32

## 2024-03-03 RX ADMIN — CLOPIDOGREL BISULFATE 75 MILLIGRAM(S): 75 TABLET, FILM COATED ORAL at 11:34

## 2024-03-03 RX ADMIN — FINASTERIDE 5 MILLIGRAM(S): 5 TABLET, FILM COATED ORAL at 11:35

## 2024-03-03 RX ADMIN — CARVEDILOL PHOSPHATE 25 MILLIGRAM(S): 80 CAPSULE, EXTENDED RELEASE ORAL at 17:10

## 2024-03-03 RX ADMIN — Medication 1: at 17:09

## 2024-03-03 RX ADMIN — Medication 650 MILLIGRAM(S): at 11:43

## 2024-03-03 NOTE — PROGRESS NOTE ADULT - PROBLEM SELECTOR PROBLEM 5
Pain in both knees

## 2024-03-03 NOTE — PROGRESS NOTE ADULT - PROBLEM SELECTOR PLAN 4
- will continue Finasteride

## 2024-03-03 NOTE — PROGRESS NOTE ADULT - PROBLEM SELECTOR PLAN 2
- will continue ASA, Plavix, Coreg, Imdur, ARB and statin

## 2024-03-03 NOTE — PROGRESS NOTE ADULT - PROBLEM SELECTOR PLAN 6
- Heparin sc  Diet DASH TLC  Dispo pending cards workup
- Heparin sc  Diet DASH TLC  Dispo pending ANA placement- pending auth- discussed with pt
- Heparin sc  Diet DASH TLC  Dispo pending ANA placement
- Heparin sc  Diet DASH TLC  Dispo pending ANA placement- pending auth
- Heparin sc  Diet DASH TLC  Dispo pending ANA placement- pending auth- discussed with pt
- Heparin sc  Diet DASH TLC  Dispo pending cards workup

## 2024-03-03 NOTE — PROGRESS NOTE ADULT - PROBLEM SELECTOR PLAN 3
- will hold Metformin  - will monitor fingersticks and cover with insulin sliding scale  - will follow up A1C 6.4
- will hold Metformin  - will monitor fingersticks and cover with insulin sliding scale  - will follow up A1C 6.4
- will hold Metformin  - will monitor fingersticks and cover with insulin sliding scale  - will follow up A1C
- will hold Metformin  - will monitor fingersticks and cover with insulin sliding scale  - will follow up A1C 6.4

## 2024-03-03 NOTE — PROGRESS NOTE ADULT - SUBJECTIVE AND OBJECTIVE BOX
[facility] Division of Hospital Medicine  Gabrielle Schmidt MD  Available via MS Teams      SUBJECTIVE / OVERNIGHT EVENTS: No acute events overnight. Pt has no complaints     ADDITIONAL REVIEW OF SYSTEMS: ROS reviewed and found to be negative    MEDICATIONS  (STANDING):  aspirin  chewable 81 milliGRAM(s) Oral daily  atorvastatin 40 milliGRAM(s) Oral at bedtime  carvedilol 25 milliGRAM(s) Oral every 12 hours  chlorhexidine 2% Cloths 1 Application(s) Topical <User Schedule>  clopidogrel Tablet 75 milliGRAM(s) Oral daily  dextrose 5%. 1000 milliLiter(s) (100 mL/Hr) IV Continuous <Continuous>  dextrose 5%. 1000 milliLiter(s) (50 mL/Hr) IV Continuous <Continuous>  dextrose 50% Injectable 12.5 Gram(s) IV Push once  dextrose 50% Injectable 25 Gram(s) IV Push once  dextrose 50% Injectable 25 Gram(s) IV Push once  finasteride 5 milliGRAM(s) Oral daily  glucagon  Injectable 1 milliGRAM(s) IntraMuscular once  insulin lispro (ADMELOG) corrective regimen sliding scale   SubCutaneous three times a day before meals  insulin lispro (ADMELOG) corrective regimen sliding scale   SubCutaneous at bedtime  isosorbide   mononitrate ER Tablet (IMDUR) 60 milliGRAM(s) Oral daily  lidocaine   4% Patch 1 Patch Transdermal daily  losartan 100 milliGRAM(s) Oral daily  melatonin 1 milliGRAM(s) Oral at bedtime  sodium chloride 0.9%. 1000 milliLiter(s) (75 mL/Hr) IV Continuous <Continuous>  sodium chloride 0.9%. 1000 milliLiter(s) (75 mL/Hr) IV Continuous <Continuous>    MEDICATIONS  (PRN):  acetaminophen     Tablet .. 650 milliGRAM(s) Oral every 6 hours PRN Temp greater or equal to 38C (100.4F), Mild Pain (1 - 3)  dextrose Oral Gel 15 Gram(s) Oral once PRN Blood Glucose LESS THAN 70 milliGRAM(s)/deciliter  polyethylene glycol 3350 17 Gram(s) Oral daily PRN for constipation      I&O's Summary    02 Mar 2024 07:01  -  03 Mar 2024 07:00  --------------------------------------------------------  IN: 0 mL / OUT: 1800 mL / NET: -1800 mL    03 Mar 2024 07:01  -  03 Mar 2024 10:52  --------------------------------------------------------  IN: 0 mL / OUT: 2200 mL / NET: -2200 mL        PHYSICAL EXAM:  Vital Signs Last 24 Hrs  T(C): 36.7 (03 Mar 2024 05:01), Max: 37 (02 Mar 2024 21:36)  T(F): 98.1 (03 Mar 2024 05:01), Max: 98.6 (02 Mar 2024 21:36)  HR: 52 (03 Mar 2024 05:46) (52 - 61)  BP: 146/68 (03 Mar 2024 05:01) (138/65 - 162/84)  BP(mean): --  RR: 18 (03 Mar 2024 05:01) (18 - 18)  SpO2: 97% (03 Mar 2024 05:01) (96% - 97%)    Parameters below as of 03 Mar 2024 05:01  Patient On (Oxygen Delivery Method): room air      CONSTITUTIONAL: NAD, well-developed, sitting up in chair  NECK: Supple  RESPIRATORY: Normal respiratory effort; lungs are clear to auscultation bilaterally  CARDIOVASCULAR: Regular rate and rhythm, normal S1 and S2  ABDOMEN: Nontender to palpation  MUSCULOSKELETAL: No noted joint swelling  PSYCH: A+O to person, place, and time  SKIN: No rashes    LABS:                        12.0   7.49  )-----------( 334      ( 02 Mar 2024 07:24 )             35.1     03-02    142  |  109<H>  |  26<H>  ----------------------------<  102<H>  4.1   |  21<L>  |  0.78    Ca    9.6      02 Mar 2024 07:24  Phos  2.8     03-02  Mg     1.7     03-02

## 2024-03-03 NOTE — PROGRESS NOTE ADULT - PROBLEM SELECTOR PLAN 1
sharp chest pain at rest and reproducible, no exertional chest pain with PT at rehab, EKG unchanged and Troponin 31 and 28  - TTE limited windows but unremarkable  - Stress test results pending  - tele monitor for arrhythmia  - will continue ASA, Plavix, Coreg, Imdur and statin  - will risk stratify with A1C, FLP and TSH
sharp chest pain at rest and reproducible, no exertional chest pain with PT at rehab, EKG unchanged and Troponin 31 and 28  - TTE limited windows but unremarkable  - Stress test results abnormal  - tele monitor for arrhythmia  - will continue ASA, Plavix, Coreg, Imdur and statin  - will risk stratify with A1C pending, FLP(triglycerides elevated, low HDL, LDL 87- LDL should be under 70) and TSH (nl)  - S/p cath with stent x1 CHARITY to mLAD
sharp chest pain at rest and reproducible, no exertional chest pain with PT at rehab, EKG unchanged and Troponin 31 and 28  - TTE limited windows but unremarkable  - Stress test results abnormal  - tele monitor for arrhythmia  - will continue ASA, Plavix, Coreg, Imdur and statin  - will risk stratify with A1C pending, FLP(triglycerides elevated, low HDL, LDL 87) and TSH (nl)  - Plan for cath today 2/28 discussed with Dr. Cordon cardiology
sharp chest pain at rest and reproducible, no exertional chest pain with PT at rehab, EKG unchanged and Troponin 31 and 28  - TTE limited windows but unremarkable  - Stress test results abnormal  - tele monitor for arrhythmia  - will continue ASA, Plavix, Coreg, Imdur and statin  - will risk stratify with A1C pending, FLP(triglycerides elevated, low HDL, LDL 87- LDL should be under 70) and TSH (nl)  - S/p cath with stent x1 CHARITY to mLAD

## 2024-03-03 NOTE — PROGRESS NOTE ADULT - TIME BILLING
- Ordering, reviewing, and interpreting labs, testing  - Independently obtaining a review of systems and performing a physical exam  - Reviewing consultant documentation/recommendations in addition to discussing plan of care with consultants.  - Counselling and educating patient regarding interpretation of aforementioned items and plan of care.
- Independently obtaining a review of systems and performing a physical exam  - Reviewing consultant documentation  - Counselling and educating patient  regarding interpretation of aforementioned items and plan of care.
- Ordering, reviewing, and interpreting labs, testing  - Independently obtaining a review of systems and performing a physical exam  - Reviewing consultant documentation/recommendations in addition to discussing plan of care with consultants.  - Counselling and educating patient regarding interpretation of aforementioned items and plan of care.
- Ordering, reviewing, and interpreting labs, testing, and imaging.  - Independently obtaining a review of systems and performing a physical exam  - Reviewing consultant documentation  - Counselling and educating patient regarding interpretation of aforementioned items and plan of care.
- Ordering, reviewing, and interpreting labs  - performing a physical exam  - discussing plan of care with consultants.  - Counselling and educating patient regarding interpretation of aforementioned items and plan of care.
- Ordering, reviewing, and interpreting labs, testing  - Independently obtaining a review of systems and performing a physical exam  - Reviewing consultant documentation  - Counselling and educating patient regarding interpretation of aforementioned items and plan of care.

## 2024-03-03 NOTE — PROGRESS NOTE ADULT - ASSESSMENT
78-year-old male history of  CAD s/p PCI (last 2021 to OM1), HTN, HLD, DM2, BPH presents with chest pain at rest from rehab admitted for ischemic evaluation. Abnormal stress test. Cath done with CHARITY x1 to mLAD
78-year-old male history of  CAD s/p PCI (last 2021 to OM1), HTN, HLD, DM2, BPH presents with chest pain at rest from rehab admitted for ischemic evaluation
78-year-old male history of  CAD s/p PCI (last 2021 to OM1), HTN, HLD, DM2, BPH presents with chest pain at rest from rehab admitted for ischemic evaluation

## 2024-03-03 NOTE — PROGRESS NOTE ADULT - PROBLEM SELECTOR PLAN 5
- Tylenol prn and lidoderm patch

## 2024-03-03 NOTE — PROGRESS NOTE ADULT - PROBLEM SELECTOR PROBLEM 1
Atypical chest pain

## 2024-03-03 NOTE — PROGRESS NOTE ADULT - PROBLEM SELECTOR PROBLEM 2
CAD S/P percutaneous coronary angioplasty

## 2024-03-04 VITALS
TEMPERATURE: 99 F | RESPIRATION RATE: 18 BRPM | DIASTOLIC BLOOD PRESSURE: 65 MMHG | SYSTOLIC BLOOD PRESSURE: 163 MMHG | OXYGEN SATURATION: 98 % | HEART RATE: 58 BPM

## 2024-03-04 LAB
GLUCOSE BLDC GLUCOMTR-MCNC: 103 MG/DL — HIGH (ref 70–99)
GLUCOSE BLDC GLUCOMTR-MCNC: 160 MG/DL — HIGH (ref 70–99)
SARS-COV-2 RNA SPEC QL NAA+PROBE: SIGNIFICANT CHANGE UP

## 2024-03-04 RX ORDER — LANOLIN ALCOHOL/MO/W.PET/CERES
5 CREAM (GRAM) TOPICAL ONCE
Refills: 0 | Status: COMPLETED | OUTPATIENT
Start: 2024-03-04 | End: 2024-03-04

## 2024-03-04 RX ORDER — ISOSORBIDE MONONITRATE 60 MG/1
1 TABLET, EXTENDED RELEASE ORAL
Qty: 0 | Refills: 0 | DISCHARGE
Start: 2024-03-04

## 2024-03-04 RX ORDER — LANOLIN ALCOHOL/MO/W.PET/CERES
1 CREAM (GRAM) TOPICAL
Qty: 0 | Refills: 0 | DISCHARGE
Start: 2024-03-04

## 2024-03-04 RX ORDER — ISOSORBIDE MONONITRATE 60 MG/1
1 TABLET, EXTENDED RELEASE ORAL
Refills: 0 | DISCHARGE

## 2024-03-04 RX ADMIN — Medication 5 MILLIGRAM(S): at 01:36

## 2024-03-04 RX ADMIN — ISOSORBIDE MONONITRATE 60 MILLIGRAM(S): 60 TABLET, EXTENDED RELEASE ORAL at 12:11

## 2024-03-04 RX ADMIN — LIDOCAINE 1 PATCH: 4 CREAM TOPICAL at 12:15

## 2024-03-04 RX ADMIN — Medication 650 MILLIGRAM(S): at 12:11

## 2024-03-04 RX ADMIN — FINASTERIDE 5 MILLIGRAM(S): 5 TABLET, FILM COATED ORAL at 12:13

## 2024-03-04 RX ADMIN — Medication 81 MILLIGRAM(S): at 12:11

## 2024-03-04 RX ADMIN — LOSARTAN POTASSIUM 100 MILLIGRAM(S): 100 TABLET, FILM COATED ORAL at 05:34

## 2024-03-04 RX ADMIN — CHLORHEXIDINE GLUCONATE 1 APPLICATION(S): 213 SOLUTION TOPICAL at 05:34

## 2024-03-04 RX ADMIN — CLOPIDOGREL BISULFATE 75 MILLIGRAM(S): 75 TABLET, FILM COATED ORAL at 12:12

## 2024-03-04 RX ADMIN — Medication 1: at 12:12

## 2024-03-04 RX ADMIN — Medication 650 MILLIGRAM(S): at 01:36

## 2024-03-04 RX ADMIN — Medication 650 MILLIGRAM(S): at 12:58

## 2024-03-04 NOTE — DISCHARGE NOTE NURSING/CASE MANAGEMENT/SOCIAL WORK - NSDCPEFALRISK_GEN_ALL_CORE
For information on Fall & Injury Prevention, visit: [email or web address] OR  [email or web address] OR  https://www.cdc.gov/steadi/patient.html

## 2024-03-04 NOTE — DISCHARGE NOTE NURSING/CASE MANAGEMENT/SOCIAL WORK - PATIENT PORTAL LINK FT
You can access the FollowMyHealth Patient Portal offered by [facility] by registering at the following website: [email or web address]. By joining [facility]’s FollowMyHealth portal, you will also be able to view your health information using other applications (apps) compatible with our system.

## 2024-03-04 NOTE — PROGRESS NOTE ADULT - SUBJECTIVE AND OBJECTIVE BOX
DATE OF SERVICE: 03-04-24 @ 12:13    Patient is a 78y old  Male who presents with a chief complaint of chest pain (04 Mar 2024 10:35)      INTERVAL HISTORY: Feels well, no acute events    REVIEW OF SYSTEMS:  CONSTITUTIONAL: No weakness  EYES/ENT: No visual changes;  No throat pain   NECK: No pain or stiffness  RESPIRATORY: No cough, wheezing; No shortness of breath  CARDIOVASCULAR: No chest pain or palpitations  GASTROINTESTINAL: No abdominal  pain. No nausea, vomiting, or hematemesis  GENITOURINARY: No dysuria, frequency or hematuria  NEUROLOGICAL: No stroke like symptoms  SKIN: No rashes    TELEMETRY Personally reviewed: SB/SR 50-60  	  MEDICATIONS:  carvedilol 25 milliGRAM(s) Oral every 12 hours  isosorbide   mononitrate ER Tablet (IMDUR) 60 milliGRAM(s) Oral daily  losartan 100 milliGRAM(s) Oral daily        PHYSICAL EXAM:  T(C): 36.4 (03-04-24 @ 04:11), Max: 36.8 (03-03-24 @ 21:36)  HR: 55 (03-04-24 @ 05:28) (55 - 62)  BP: 139/60 (03-04-24 @ 04:11) (139/60 - 153/61)  RR: 18 (03-04-24 @ 04:11) (18 - 18)  SpO2: 97% (03-04-24 @ 04:11) (94% - 97%)  Wt(kg): --  I&O's Summary    03 Mar 2024 07:01  -  04 Mar 2024 07:00  --------------------------------------------------------  IN: 850 mL / OUT: 2550 mL / NET: -1700 mL          Appearance: In no distress	  HEENT:    PERRL, EOMI	  Cardiovascular:  S1 S2, No JVD  Respiratory: Lungs clear to auscultation	  Gastrointestinal:  Soft, Non-tender, + BS	  Vascularature:  No edema of LE  Psychiatric: Appropriate affect   Neuro: no acute focal deficits                     Labs personally reviewed      ASSESSMENT/PLAN: 	  78-year-old male history of  CAD s/p PCI (last 2021 to OM1), HTN, HLD, DM2, BPH presents with chest pain at rest from rehab admitted for ischemic evaluation        Problem/Plan - 1:  ·  Problem: Atypical chest pain.   ·  Plan: sharp chest pain at rest and reproducible, no exertional chest pain with PT at rehab, EKG unchanged and Troponin 31 and 28  - TTE limited windows but unremarkable  - NM stress test with large area of ischemia  - s/p cardiac cath 2/28 with PCI to mLCx  - c/w ASA 81mg PO, Plavix 75mg PO and Atorvastatin 40mg PO daily  - DC pending ANA placement     Problem/Plan - 2:  ·  Problem: CAD S/P percutaneous coronary angioplasty.   ·  Plan: - will continue ASA, Plavix, Coreg, Imdur, and statin.    Problem/Plan - 3:  ·  Problem: Diabetes mellitus.   ·  Plan: NISS  - A1C 6.4    Problem/Plan - 4:  ·  Problem: HTN   ·  Plan: Will continue Coreg, Imdur and Losartan    Problem/Plan - 5:  ·  Problem: Prophylactic measure.   ·  Plan: - Heparin sc.            CINDY Carmona DO [facility]  Cardiovascular Medicine  [address] [place] [address], [address]  Available via call or text on [employer]  Office: [phone]

## 2024-03-04 NOTE — DISCHARGE NOTE NURSING/CASE MANAGEMENT/SOCIAL WORK - NSDCFUADDAPPT_GEN_ALL_CORE_FT
APPTS ARE READY TO BE MADE: [x ] YES    Best Family or Patient Contact (if needed):    Additional Information about above appointments (if needed):    1: Dr. Lagos  2:   3:     Other comments or requests:

## 2024-03-04 NOTE — PROGRESS NOTE ADULT - PROVIDER SPECIALTY LIST ADULT
Cardiology
Intervent Cardiology
Cardiology
Internal Medicine

## 2024-03-04 NOTE — PROGRESS NOTE ADULT - SUBJECTIVE AND OBJECTIVE BOX
DATE OF SERVICE: 03-04-24 @ 10:36    Patient is a 78y old  Male who presents with a chief complaint of chest pain (03 Mar 2024 11:57)      INTERVAL HISTORY: Feels well    REVIEW OF SYSTEMS:  CONSTITUTIONAL: No weakness  EYES/ENT: No visual changes;  No throat pain   NECK: No pain or stiffness  RESPIRATORY: No cough, wheezing; No shortness of breath  CARDIOVASCULAR: No chest pain or palpitations  GASTROINTESTINAL: No abdominal  pain. No nausea, vomiting, or hematemesis  GENITOURINARY: No dysuria, frequency or hematuria  NEUROLOGICAL: No stroke like symptoms  SKIN: No rashes    TELEMETRY Personally reviewed: SB/SR 50-60s PVCs  	  MEDICATIONS:  carvedilol 25 milliGRAM(s) Oral every 12 hours  isosorbide   mononitrate ER Tablet (IMDUR) 60 milliGRAM(s) Oral daily  losartan 100 milliGRAM(s) Oral daily        PHYSICAL EXAM:  T(C): 36.4 (03-04-24 @ 04:11), Max: 36.8 (03-03-24 @ 21:36)  HR: 55 (03-04-24 @ 05:28) (55 - 62)  BP: 139/60 (03-04-24 @ 04:11) (102/62 - 153/61)  RR: 18 (03-04-24 @ 04:11) (18 - 18)  SpO2: 97% (03-04-24 @ 04:11) (94% - 99%)  Wt(kg): --  I&O's Summary    03 Mar 2024 07:01  -  04 Mar 2024 07:00  --------------------------------------------------------  IN: 850 mL / OUT: 2550 mL / NET: -1700 mL          Appearance: In no distress	  HEENT:    PERRL, EOMI	  Cardiovascular:  S1 S2, No JVD  Respiratory: Lungs clear to auscultation	  Gastrointestinal:  Soft, Non-tender, + BS	  Vascularature:  No edema of LE  Psychiatric: Appropriate affect   Neuro: no acute focal deficits                     Labs personally reviewed      ASSESSMENT/PLAN: 	  78-year-old male history of  CAD s/p PCI (last 2021 to OM1), HTN, HLD, DM2, BPH presents with chest pain at rest from rehab admitted for ischemic evaluation        Problem/Plan - 1:  ·  Problem: Atypical chest pain.   ·  Plan: sharp chest pain at rest and reproducible, no exertional chest pain with PT at rehab, EKG unchanged and Troponin 31 and 28  - TTE limited windows but unremarkable  - NM stress test with large area of ischemia  - s/p cardiac cath 2/28 with PCI to mLCx  - c/w ASA 81mg PO, Plavix 75mg PO and Atorvastatin 40mg PO daily  - DC pending ANA placement     Problem/Plan - 2:  ·  Problem: CAD S/P percutaneous coronary angioplasty.   ·  Plan: - will continue ASA, Plavix, Coreg, Imdur, and statin.    Problem/Plan - 3:  ·  Problem: Diabetes mellitus.   ·  Plan: NISS  - A1C 6.4    Problem/Plan - 4:  ·  Problem: HTN   ·  Plan: Will continue Coreg, Imdur and Losartan    Problem/Plan - 5:  ·  Problem: Prophylactic measure.   ·  Plan: - Heparin sc.          CINDY Carmona DO [facility]  Cardiovascular Medicine  [address] [place] [address], [address]  Available via call or text on [employer] TEAM[employer]  Office: [phone]